# Patient Record
Sex: MALE | Race: BLACK OR AFRICAN AMERICAN | Employment: UNEMPLOYED | URBAN - METROPOLITAN AREA
[De-identification: names, ages, dates, MRNs, and addresses within clinical notes are randomized per-mention and may not be internally consistent; named-entity substitution may affect disease eponyms.]

---

## 2019-01-19 ENCOUNTER — APPOINTMENT (OUTPATIENT)
Dept: GENERAL RADIOLOGY | Age: 44
End: 2019-01-19
Payer: MEDICARE

## 2019-01-19 ENCOUNTER — HOSPITAL ENCOUNTER (EMERGENCY)
Age: 44
Discharge: HOME | End: 2019-01-19
Attending: EMERGENCY MEDICINE
Payer: MEDICARE

## 2019-01-19 VITALS
SYSTOLIC BLOOD PRESSURE: 170 MMHG | WEIGHT: 234.5 LBS | TEMPERATURE: 98.5 F | OXYGEN SATURATION: 99 % | HEIGHT: 69 IN | DIASTOLIC BLOOD PRESSURE: 106 MMHG | HEART RATE: 94 BPM | BODY MASS INDEX: 34.73 KG/M2

## 2019-01-19 DIAGNOSIS — K12.2 UVULITIS: Primary | ICD-10-CM

## 2019-01-19 DIAGNOSIS — R03.0 ELEVATED BLOOD PRESSURE READING: ICD-10-CM

## 2019-01-19 PROCEDURE — 99284 EMERGENCY DEPT VISIT MOD MDM: CPT

## 2019-01-19 PROCEDURE — 70360 X-RAY EXAM OF NECK: CPT

## 2019-01-19 RX ORDER — PENICILLIN V POTASSIUM 500 MG/1
500 TABLET ORAL 3 TIMES DAILY
Qty: 30 TABLET | Refills: 0 | Status: SHIPPED | OUTPATIENT
Start: 2019-01-19 | End: 2019-01-29

## 2019-01-19 RX ORDER — PREDNISONE 20 MG/1
20 TABLET ORAL 2 TIMES DAILY
Qty: 10 TABLET | Refills: 0 | Status: SHIPPED | OUTPATIENT
Start: 2019-01-19 | End: 2019-01-24

## 2019-04-04 ENCOUNTER — HOSPITAL ENCOUNTER (EMERGENCY)
Age: 44
Discharge: HOME | End: 2019-04-04
Attending: EMERGENCY MEDICINE
Payer: MEDICARE

## 2019-04-04 VITALS
WEIGHT: 214.5 LBS | HEIGHT: 69 IN | HEART RATE: 109 BPM | RESPIRATION RATE: 19 BRPM | BODY MASS INDEX: 31.77 KG/M2 | SYSTOLIC BLOOD PRESSURE: 143 MMHG | OXYGEN SATURATION: 98 % | DIASTOLIC BLOOD PRESSURE: 103 MMHG | TEMPERATURE: 98.3 F

## 2019-04-04 DIAGNOSIS — J06.9 ACUTE UPPER RESPIRATORY INFECTION: Primary | ICD-10-CM

## 2019-04-04 PROCEDURE — 6370000000 HC RX 637 (ALT 250 FOR IP): Performed by: EMERGENCY MEDICINE

## 2019-04-04 PROCEDURE — 99283 EMERGENCY DEPT VISIT LOW MDM: CPT

## 2019-04-04 RX ORDER — BENZONATATE 100 MG/1
100 CAPSULE ORAL 3 TIMES DAILY PRN
Qty: 30 CAPSULE | Refills: 0 | Status: SHIPPED | OUTPATIENT
Start: 2019-04-04 | End: 2019-04-11

## 2019-04-04 RX ORDER — DOXYCYCLINE 100 MG/1
100 CAPSULE ORAL ONCE
Status: COMPLETED | OUTPATIENT
Start: 2019-04-04 | End: 2019-04-04

## 2019-04-04 RX ORDER — DOXYCYCLINE 100 MG/1
100 TABLET ORAL 2 TIMES DAILY
Qty: 20 TABLET | Refills: 0 | Status: SHIPPED | OUTPATIENT
Start: 2019-04-04 | End: 2019-04-14

## 2019-04-04 RX ORDER — CETIRIZINE HYDROCHLORIDE 10 MG/1
10 TABLET ORAL DAILY
Qty: 20 TABLET | Refills: 1 | Status: SHIPPED | OUTPATIENT
Start: 2019-04-04 | End: 2019-10-29 | Stop reason: SDUPTHER

## 2019-04-04 RX ORDER — BENZONATATE 100 MG/1
100 CAPSULE ORAL 3 TIMES DAILY PRN
Status: DISCONTINUED | OUTPATIENT
Start: 2019-04-04 | End: 2019-04-04 | Stop reason: HOSPADM

## 2019-04-04 RX ADMIN — DOXYCYCLINE 100 MG: 100 CAPSULE ORAL at 00:37

## 2019-04-04 RX ADMIN — BENZONATATE 100 MG: 100 CAPSULE ORAL at 00:37

## 2019-04-04 ASSESSMENT — PAIN DESCRIPTION - DESCRIPTORS: DESCRIPTORS: ACHING

## 2019-04-04 ASSESSMENT — PAIN SCALES - GENERAL: PAINLEVEL_OUTOF10: 2

## 2019-04-04 ASSESSMENT — PAIN DESCRIPTION - LOCATION: LOCATION: RIB CAGE

## 2019-04-04 ASSESSMENT — PAIN DESCRIPTION - FREQUENCY: FREQUENCY: INTERMITTENT

## 2019-04-04 ASSESSMENT — PAIN DESCRIPTION - PAIN TYPE: TYPE: ACUTE PAIN

## 2019-04-04 NOTE — ED PROVIDER NOTES
Use    Smoking status: Passive Smoke Exposure - Never Smoker    Smokeless tobacco: Never Used   Substance and Sexual Activity    Alcohol use: Yes     Alcohol/week: 1.2 oz     Types: 2 Cans of beer per week     Comment: Daily.  Drug use: Not Currently    Sexual activity: Yes     Partners: Female   Lifestyle    Physical activity:     Days per week: None     Minutes per session: None    Stress: None   Relationships    Social connections:     Talks on phone: None     Gets together: None     Attends Religion service: None     Active member of club or organization: None     Attends meetings of clubs or organizations: None     Relationship status: None    Intimate partner violence:     Fear of current or ex partner: None     Emotionally abused: None     Physically abused: None     Forced sexual activity: None   Other Topics Concern    None   Social History Narrative    None       SCREENINGS      @FLOW([id number])@      PHYSICAL EXAM    (up to 7 for level 4, 8 or more for level 5)     ED Triage Vitals [04/04/19 0027]   BP Temp Temp Source Pulse Resp SpO2 Height Weight   (!) 143/103 98.3 °F (36.8 °C) Oral 109 19 98 % 5' 9\" (1.753 m) 214 lb 8 oz (97.3 kg)       Physical Exam      General Appearance:  Alert, cooperative, no distress, appears stated age. Head:  Normocephalic, without obviousabnormality, atraumatic. Eyes:  conjunctiva/corneas clear, EOM's intact. Sclera anicteric. ENT: Mucous membranes moist.   Neck: Supple, symmetrical, trachea midline, no adenopathy. No jugular venous distention. Lungs:   Clear to auscultation bilaterally, respirationsunlabored. No rales, rhonchi or wheezes. Chest Wall:  No tenderness. Heart:  Regular rate and rhythm, S1 and S2 normal, no murmur, rub or gallop. Abdomen:   Soft, non-tender, bowel sounds active,   no masses, no organomegaly. Extremities: No edema, cords or calf tenderness. Full range of motion.    Pulses: 2+ and symmetric   Skin: Turgor is normal, no rashes or lesions. Neurologic: Alert and oriented X 3. No focal findings. Motor grossly normal.  Speech clear, no drift, CN III-XII grossly intact,        DIAGNOSTIC RESULTS   LABS:    Labs Reviewed - No data to display    All other labs were within normal range or not returned as of this dictation. EKG: All EKG's are interpreted by the Emergency Department Physician who eithersigns or Co-signs this chart in the absence of a cardiologist.        RADIOLOGY:   Non-plain film images such as CT, Ultrasound and MRI are read by the radiologist. Plain radiographic images are visualized by myself. *    Interpretation per the Radiologist below, if available at the time of this note:    No orders to display         PROCEDURES   Unless otherwise noted below, none     Procedures    *    CRITICAL CARE TIME   N/A      EMERGENCY DEPARTMENT COURSE and DIFFERENTIALDIAGNOSIS/MDM:   Vitals:    Vitals:    04/04/19 0027   BP: (!) 143/103   Pulse: 109   Resp: 19   Temp: 98.3 °F (36.8 °C)   TempSrc: Oral   SpO2: 98%   Weight: 97.3 kg (214 lb 8 oz)   Height: 5' 9\" (1.753 m)       Patient was given thefollowing medications:  Medications   doxycycline monohydrate (MONODOX) capsule 100 mg (has no administration in time range)   benzonatate (TESSALON) capsule 100 mg (has no administration in time range)           The patient tolerated their visit well. The patient and / or the familywere informed of the results of any tests, a time was given to answer questions. FINAL IMPRESSION      1.  Acute upper respiratory infection          DISPOSITION/PLAN   DISPOSITION Decision To Discharge 04/04/2019 12:29:57 AM      PATIENT REFERRED TO:  [place] [id number]  [phone]    In 2 days        DISCHARGE MEDICATIONS:  New Prescriptions    BENZONATATE (TESSALON PERLES) 100 MG CAPSULE    Take 1 capsule by mouth 3 times daily as needed for Cough    CETIRIZINE (ZYRTEC ALLERGY) 10 MG TABLET    Take 1 tablet by mouth daily    DOXYCYCLINE MONOHYDRATE (ADOXA) 100 MG TABLET    Take 1 tablet by mouth 2 times daily for 10 days       DISCONTINUED MEDICATIONS:  Discontinued Medications    No medications on file              (Please note that portions of this note were completed with a voice recognition program.  Efforts were made to edit the dictations but occasionally words are mis-transcribed.)    Ky Miller MD (electronically signed)      Ky Miller MD  04/04/19 8041

## 2019-10-16 ENCOUNTER — HOSPITAL ENCOUNTER (EMERGENCY)
Age: 44
Discharge: HOME | End: 2019-10-16
Attending: EMERGENCY MEDICINE
Payer: MEDICARE

## 2019-10-16 ENCOUNTER — APPOINTMENT (OUTPATIENT)
Dept: GENERAL RADIOLOGY | Age: 44
End: 2019-10-16
Payer: MEDICARE

## 2019-10-16 VITALS
DIASTOLIC BLOOD PRESSURE: 98 MMHG | HEIGHT: 69 IN | RESPIRATION RATE: 16 BRPM | OXYGEN SATURATION: 100 % | BODY MASS INDEX: 30.36 KG/M2 | SYSTOLIC BLOOD PRESSURE: 135 MMHG | HEART RATE: 96 BPM | WEIGHT: 205 LBS | TEMPERATURE: 97.4 F

## 2019-10-16 DIAGNOSIS — R06.00 DYSPNEA, UNSPECIFIED TYPE: Primary | ICD-10-CM

## 2019-10-16 LAB
EKG ATRIAL RATE: 93 BPM
EKG DIAGNOSIS: NORMAL
EKG P AXIS: 48 DEGREES
EKG P-R INTERVAL: 146 MS
EKG Q-T INTERVAL: 354 MS
EKG QRS DURATION: 82 MS
EKG QTC CALCULATION (BAZETT): 440 MS
EKG R AXIS: -17 DEGREES
EKG T AXIS: 39 DEGREES
EKG VENTRICULAR RATE: 93 BPM

## 2019-10-16 PROCEDURE — 94761 N-INVAS EAR/PLS OXIMETRY MLT: CPT

## 2019-10-16 PROCEDURE — 6360000002 HC RX W HCPCS: Performed by: EMERGENCY MEDICINE

## 2019-10-16 PROCEDURE — 93005 ELECTROCARDIOGRAM TRACING: CPT | Performed by: EMERGENCY MEDICINE

## 2019-10-16 PROCEDURE — 99284 EMERGENCY DEPT VISIT MOD MDM: CPT

## 2019-10-16 PROCEDURE — 94640 AIRWAY INHALATION TREATMENT: CPT

## 2019-10-16 PROCEDURE — 93010 ELECTROCARDIOGRAM REPORT: CPT | Performed by: INTERNAL MEDICINE

## 2019-10-16 PROCEDURE — 70360 X-RAY EXAM OF NECK: CPT

## 2019-10-16 PROCEDURE — 71046 X-RAY EXAM CHEST 2 VIEWS: CPT

## 2019-10-16 RX ORDER — ALBUTEROL SULFATE 2.5 MG/3ML
2.5 SOLUTION RESPIRATORY (INHALATION) ONCE
Status: COMPLETED | OUTPATIENT
Start: 2019-10-16 | End: 2019-10-16

## 2019-10-16 RX ORDER — ALBUTEROL SULFATE 90 UG/1
2 AEROSOL, METERED RESPIRATORY (INHALATION) EVERY 4 HOURS PRN
Qty: 1 INHALER | Refills: 0 | Status: SHIPPED | OUTPATIENT
Start: 2019-10-16 | End: 2019-10-29 | Stop reason: SDUPTHER

## 2019-10-16 RX ADMIN — ALBUTEROL SULFATE 2.5 MG: 2.5 SOLUTION RESPIRATORY (INHALATION) at 06:21

## 2019-10-29 ENCOUNTER — OFFICE VISIT (OUTPATIENT)
Dept: PRIMARY CARE CLINIC | Age: 44
End: 2019-10-29
Payer: MEDICARE

## 2019-10-29 VITALS
HEART RATE: 97 BPM | WEIGHT: 199 LBS | SYSTOLIC BLOOD PRESSURE: 129 MMHG | HEIGHT: 67 IN | DIASTOLIC BLOOD PRESSURE: 86 MMHG | BODY MASS INDEX: 31.23 KG/M2 | TEMPERATURE: 98.6 F | OXYGEN SATURATION: 98 %

## 2019-10-29 DIAGNOSIS — Z76.89 ENCOUNTER TO ESTABLISH CARE: Primary | ICD-10-CM

## 2019-10-29 DIAGNOSIS — Z78.9 ALCOHOL USE: ICD-10-CM

## 2019-10-29 DIAGNOSIS — J30.89 NON-SEASONAL ALLERGIC RHINITIS, UNSPECIFIED TRIGGER: ICD-10-CM

## 2019-10-29 DIAGNOSIS — Z83.3 FAMILY HISTORY OF DIABETES MELLITUS (DM): ICD-10-CM

## 2019-10-29 DIAGNOSIS — H61.23 BILATERAL IMPACTED CERUMEN: ICD-10-CM

## 2019-10-29 DIAGNOSIS — Z82.49 FAMILY HISTORY OF HYPERTENSION: ICD-10-CM

## 2019-10-29 LAB — HBA1C MFR BLD: 5.5 %

## 2019-10-29 PROCEDURE — 83036 HEMOGLOBIN GLYCOSYLATED A1C: CPT | Performed by: FAMILY MEDICINE

## 2019-10-29 PROCEDURE — 99203 OFFICE O/P NEW LOW 30 MIN: CPT | Performed by: FAMILY MEDICINE

## 2019-10-29 RX ORDER — ALBUTEROL SULFATE 90 UG/1
2 AEROSOL, METERED RESPIRATORY (INHALATION) EVERY 4 HOURS PRN
Qty: 1 INHALER | Refills: 3 | Status: SHIPPED | OUTPATIENT
Start: 2019-10-29 | End: 2020-01-28 | Stop reason: SDUPTHER

## 2019-10-29 RX ORDER — CETIRIZINE HYDROCHLORIDE 10 MG/1
10 TABLET ORAL DAILY
Qty: 20 TABLET | Refills: 1 | Status: SHIPPED | OUTPATIENT
Start: 2019-10-29 | End: 2020-11-07 | Stop reason: SDUPTHER

## 2019-10-29 ASSESSMENT — ENCOUNTER SYMPTOMS
BACK PAIN: 0
VOMITING: 0
EYE PAIN: 0
SHORTNESS OF BREATH: 1
DIARRHEA: 0
NAUSEA: 0
EYE REDNESS: 0
BLOOD IN STOOL: 0
ABDOMINAL PAIN: 0
RHINORRHEA: 0
WHEEZING: 0
CONSTIPATION: 0
COUGH: 0

## 2019-10-29 ASSESSMENT — PATIENT HEALTH QUESTIONNAIRE - PHQ9
2. FEELING DOWN, DEPRESSED OR HOPELESS: 0
SUM OF ALL RESPONSES TO PHQ QUESTIONS 1-9: 0
SUM OF ALL RESPONSES TO PHQ QUESTIONS 1-9: 0
SUM OF ALL RESPONSES TO PHQ9 QUESTIONS 1 & 2: 0
1. LITTLE INTEREST OR PLEASURE IN DOING THINGS: 0

## 2019-11-05 DIAGNOSIS — Z83.3 FAMILY HISTORY OF DIABETES MELLITUS (DM): ICD-10-CM

## 2019-11-05 DIAGNOSIS — Z82.49 FAMILY HISTORY OF HYPERTENSION: ICD-10-CM

## 2019-11-05 LAB
A/G RATIO: 1.3 (ref 1.1–2.2)
ALBUMIN SERPL-MCNC: 3.5 G/DL (ref 3.4–5)
ALP BLD-CCNC: 94 U/L (ref 40–129)
ALT SERPL-CCNC: 67 U/L (ref 10–40)
ANION GAP SERPL CALCULATED.3IONS-SCNC: 14 MMOL/L (ref 3–16)
AST SERPL-CCNC: 83 U/L (ref 15–37)
BASOPHILS ABSOLUTE: 0.1 K/UL (ref 0–0.2)
BASOPHILS RELATIVE PERCENT: 1.6 %
BILIRUB SERPL-MCNC: 0.7 MG/DL (ref 0–1)
BUN BLDV-MCNC: 8 MG/DL (ref 7–20)
CALCIUM SERPL-MCNC: 8.6 MG/DL (ref 8.3–10.6)
CHLORIDE BLD-SCNC: 99 MMOL/L (ref 99–110)
CHOLESTEROL, TOTAL: 262 MG/DL (ref 0–199)
CO2: 22 MMOL/L (ref 21–32)
CREAT SERPL-MCNC: 1 MG/DL (ref 0.9–1.3)
EOSINOPHILS ABSOLUTE: 0 K/UL (ref 0–0.6)
EOSINOPHILS RELATIVE PERCENT: 1.4 %
GFR AFRICAN AMERICAN: >60
GFR NON-AFRICAN AMERICAN: >60
GLOBULIN: 2.8 G/DL
GLUCOSE BLD-MCNC: 120 MG/DL (ref 70–99)
HCT VFR BLD CALC: 42.3 % (ref 40.5–52.5)
HDLC SERPL-MCNC: 61 MG/DL (ref 40–60)
HEMOGLOBIN: 14.4 G/DL (ref 13.5–17.5)
LDL CHOLESTEROL CALCULATED: ABNORMAL MG/DL
LDL CHOLESTEROL DIRECT: 17 MG/DL
LYMPHOCYTES ABSOLUTE: 1.1 K/UL (ref 1–5.1)
LYMPHOCYTES RELATIVE PERCENT: 34.5 %
MCH RBC QN AUTO: 33.4 PG (ref 26–34)
MCHC RBC AUTO-ENTMCNC: 34 G/DL (ref 31–36)
MCV RBC AUTO: 98.3 FL (ref 80–100)
MONOCYTES ABSOLUTE: 0.5 K/UL (ref 0–1.3)
MONOCYTES RELATIVE PERCENT: 17 %
NEUTROPHILS ABSOLUTE: 1.5 K/UL (ref 1.7–7.7)
NEUTROPHILS RELATIVE PERCENT: 45.5 %
PDW BLD-RTO: 14.6 % (ref 12.4–15.4)
PLATELET # BLD: 178 K/UL (ref 135–450)
PMV BLD AUTO: 10.8 FL (ref 5–10.5)
POTASSIUM SERPL-SCNC: 4.1 MMOL/L (ref 3.5–5.1)
RBC # BLD: 4.31 M/UL (ref 4.2–5.9)
SODIUM BLD-SCNC: 135 MMOL/L (ref 136–145)
TOTAL PROTEIN: 6.3 G/DL (ref 6.4–8.2)
TRIGL SERPL-MCNC: 612 MG/DL (ref 0–150)
VLDLC SERPL CALC-MCNC: ABNORMAL MG/DL
WBC # BLD: 3.2 K/UL (ref 4–11)

## 2019-11-06 DIAGNOSIS — E78.2 HYPERCHOLESTEROLEMIA WITH HYPERTRIGLYCERIDEMIA: Primary | ICD-10-CM

## 2019-11-06 RX ORDER — ICOSAPENT ETHYL 1000 MG/1
2 CAPSULE ORAL 2 TIMES DAILY
Qty: 120 CAPSULE | Refills: 3 | Status: SHIPPED | OUTPATIENT
Start: 2019-11-06 | End: 2019-12-06

## 2019-11-13 ENCOUNTER — TELEPHONE (OUTPATIENT)
Dept: PRIMARY CARE CLINIC | Age: 44
End: 2019-11-13

## 2019-11-21 ENCOUNTER — TELEPHONE (OUTPATIENT)
Dept: PRIMARY CARE CLINIC | Age: 44
End: 2019-11-21

## 2019-12-05 PROBLEM — E78.1 HYPERTRIGLYCERIDEMIA: Status: ACTIVE | Noted: 2019-12-05

## 2019-12-05 PROBLEM — E78.2 MIXED HYPERLIPIDEMIA: Status: ACTIVE | Noted: 2019-12-05

## 2019-12-06 DIAGNOSIS — E78.2 MIXED HYPERLIPIDEMIA: ICD-10-CM

## 2019-12-06 DIAGNOSIS — E66.09 NON MORBID OBESITY DUE TO EXCESS CALORIES: ICD-10-CM

## 2019-12-06 DIAGNOSIS — E78.1 HYPERTRIGLYCERIDEMIA: Primary | ICD-10-CM

## 2019-12-06 RX ORDER — ATORVASTATIN CALCIUM 10 MG/1
10 TABLET, FILM COATED ORAL DAILY
Qty: 30 TABLET | Refills: 0 | Status: SHIPPED | OUTPATIENT
Start: 2019-12-06 | End: 2020-01-28 | Stop reason: SDUPTHER

## 2019-12-06 RX ORDER — ICOSAPENT ETHYL 1000 MG/1
2 CAPSULE ORAL 2 TIMES DAILY
Qty: 120 CAPSULE | Refills: 3 | Status: SHIPPED | OUTPATIENT
Start: 2019-12-06 | End: 2020-01-28

## 2020-01-13 ENCOUNTER — TELEPHONE (OUTPATIENT)
Dept: PRIMARY CARE CLINIC | Age: 45
End: 2020-01-13

## 2020-01-13 NOTE — TELEPHONE ENCOUNTER
Please contact insurance company and try to authorize Vascepa for pt   Please be sure to add the prior auth letter that Annalise Lopez has provided to the [address] in order to assist in pt getting this medication.     This was prescribed again on 12/06/2019 and pt is still waiting to see if he can get approved with using the auth letter from Annalise Lopez     Thank you

## 2020-01-28 ENCOUNTER — OFFICE VISIT (OUTPATIENT)
Dept: PRIMARY CARE CLINIC | Age: 45
End: 2020-01-28
Payer: MEDICARE

## 2020-01-28 VITALS
DIASTOLIC BLOOD PRESSURE: 97 MMHG | OXYGEN SATURATION: 99 % | TEMPERATURE: 98.1 F | RESPIRATION RATE: 12 BRPM | HEART RATE: 72 BPM | WEIGHT: 209 LBS | SYSTOLIC BLOOD PRESSURE: 158 MMHG | BODY MASS INDEX: 33.09 KG/M2

## 2020-01-28 PROCEDURE — G8427 DOCREV CUR MEDS BY ELIG CLIN: HCPCS | Performed by: FAMILY MEDICINE

## 2020-01-28 PROCEDURE — 99215 OFFICE O/P EST HI 40 MIN: CPT | Performed by: FAMILY MEDICINE

## 2020-01-28 PROCEDURE — 1036F TOBACCO NON-USER: CPT | Performed by: FAMILY MEDICINE

## 2020-01-28 PROCEDURE — G8484 FLU IMMUNIZE NO ADMIN: HCPCS | Performed by: FAMILY MEDICINE

## 2020-01-28 PROCEDURE — G8417 CALC BMI ABV UP PARAM F/U: HCPCS | Performed by: FAMILY MEDICINE

## 2020-01-28 RX ORDER — ALBUTEROL SULFATE 90 UG/1
2 AEROSOL, METERED RESPIRATORY (INHALATION) EVERY 4 HOURS PRN
Qty: 1 INHALER | Refills: 3 | Status: SHIPPED | OUTPATIENT
Start: 2020-01-28 | End: 2020-04-03 | Stop reason: SDUPTHER

## 2020-01-28 RX ORDER — CHLORAL HYDRATE 500 MG
2000 CAPSULE ORAL 3 TIMES DAILY
Qty: 90 CAPSULE | Refills: 3 | Status: SHIPPED | OUTPATIENT
Start: 2020-01-28 | End: 2020-04-28

## 2020-01-28 RX ORDER — ATORVASTATIN CALCIUM 10 MG/1
10 TABLET, FILM COATED ORAL DAILY
Qty: 30 TABLET | Refills: 0 | Status: SHIPPED | OUTPATIENT
Start: 2020-01-28 | End: 2020-03-04

## 2020-01-28 ASSESSMENT — ENCOUNTER SYMPTOMS
RHINORRHEA: 0
SINUS PRESSURE: 0
SORE THROAT: 0
COUGH: 0
BACK PAIN: 0
SINUS PAIN: 0
ABDOMINAL PAIN: 0
SHORTNESS OF BREATH: 0

## 2020-01-28 ASSESSMENT — PATIENT HEALTH QUESTIONNAIRE - PHQ9
1. LITTLE INTEREST OR PLEASURE IN DOING THINGS: 0
SUM OF ALL RESPONSES TO PHQ9 QUESTIONS 1 & 2: 0
SUM OF ALL RESPONSES TO PHQ QUESTIONS 1-9: 0
1. LITTLE INTEREST OR PLEASURE IN DOING THINGS: 0
SUM OF ALL RESPONSES TO PHQ QUESTIONS 1-9: 0
2. FEELING DOWN, DEPRESSED OR HOPELESS: 0
SUM OF ALL RESPONSES TO PHQ QUESTIONS 1-9: 0
SUM OF ALL RESPONSES TO PHQ QUESTIONS 1-9: 0
SUM OF ALL RESPONSES TO PHQ9 QUESTIONS 1 & 2: 0
2. FEELING DOWN, DEPRESSED OR HOPELESS: 0

## 2020-01-28 NOTE — PROGRESS NOTES
for adenopathy. Psychiatric/Behavioral: Negative for behavioral problems. Past Medical History:   Diagnosis Date    Kidney stone        Family History   Problem Relation Age of Onset    Osteoarthritis Mother     Cancer Mother     Asthma Father     Diabetes Sister     Hypertension Sister     Stroke Sister     Seizures Sister        Social History     Tobacco Use    Smoking status: Former Smoker     Packs/day: 1.50     Years: 7.00     Pack years: 10.50     Types: Cigarettes     Last attempt to quit: 10/29/1997     Years since quittin.2    Smokeless tobacco: Never Used   Substance Use Topics    Alcohol use: Yes     Alcohol/week: 2.0 standard drinks     Types: 2 Cans of beer per week     Comment: Daily.  Drug use: Not Currently       New Prescriptions    OMEGA-3 FATTY ACIDS (FISH OIL) 1000 MG CAPS    Take 2 capsules by mouth 3 times daily       Meds Prior to visit:  Current Outpatient Medications on File Prior to Visit   Medication Sig Dispense Refill    cetirizine (ZYRTEC ALLERGY) 10 MG tablet Take 1 tablet by mouth daily 20 tablet 1     No current facility-administered medications on file prior to visit. Allergies   Allergen Reactions    Bee Venom        OBJECTIVE:  BP (!) 158/97   Pulse 72   Temp 98.1 °F (36.7 °C) (Oral)   Resp 12   Wt 209 lb (94.8 kg)   SpO2 99%   BMI 33.09 kg/m²   BP Readings from Last 2 Encounters:   20 (!) 158/97   10/29/19 129/86     Wt Readings from Last 3 Encounters:   20 209 lb (94.8 kg)   10/29/19 199 lb (90.3 kg)   10/16/19 205 lb (93 kg)       Physical Exam  Constitutional:       General: He is not in acute distress. Appearance: Normal appearance. He is normal weight. He is not ill-appearing. HENT:      Head: Normocephalic and atraumatic. Right Ear: Tympanic membrane, ear canal and external ear normal. There is impacted cerumen. Left Ear: Tympanic membrane, ear canal and external ear normal. There is impacted cerumen. Nose: Nose normal.      Mouth/Throat:      Mouth: Mucous membranes are moist.      Pharynx: Oropharynx is clear. No posterior oropharyngeal erythema. Eyes:      Extraocular Movements: Extraocular movements intact. Conjunctiva/sclera: Conjunctivae normal.      Pupils: Pupils are equal, round, and reactive to light. Neck:      Musculoskeletal: Normal range of motion. Cardiovascular:      Rate and Rhythm: Normal rate and regular rhythm. Pulses: Normal pulses. Heart sounds: Normal heart sounds. Pulmonary:      Effort: Pulmonary effort is normal. No respiratory distress. Breath sounds: Normal breath sounds. Abdominal:      General: Bowel sounds are normal.      Tenderness: There is no abdominal tenderness. Musculoskeletal: Normal range of motion. Skin:     General: Skin is warm. Capillary Refill: Capillary refill takes less than 2 seconds. Findings: No rash. Neurological:      General: No focal deficit present. Mental Status: He is alert and oriented to person, place, and time. Mental status is at baseline. Motor: No weakness. Coordination: Coordination normal.      Gait: Gait normal.   Psychiatric:         Mood and Affect: Mood normal.         Behavior: Behavior normal.         Thought Content:  Thought content normal.         Judgment: Judgment normal.       Lab Results   Component Value Date    WBC 3.2 (L) 11/05/2019    HGB 14.4 11/05/2019    HCT 42.3 11/05/2019    MCV 98.3 11/05/2019     11/05/2019     Lab Results   Component Value Date     (L) 11/05/2019    K 4.1 11/05/2019    CL 99 11/05/2019    CO2 22 11/05/2019    BUN 8 11/05/2019    CREATININE 1.0 11/05/2019    GLUCOSE 120 (H) 11/05/2019    CALCIUM 8.6 11/05/2019    PROT 6.3 (L) 11/05/2019    LABALBU 3.5 11/05/2019    BILITOT 0.7 11/05/2019    ALKPHOS 94 11/05/2019    AST 83 (H) 11/05/2019    ALT 67 (H) 11/05/2019    LABGLOM >60 11/05/2019    GFRAA >60 11/05/2019    AGRATIO 1.3 11/05/2019 GLOB 2.8 11/05/2019     Lab Results   Component Value Date    CHOL 262 (H) 11/05/2019     Lab Results   Component Value Date    TRIG 612 (H) 11/05/2019     Lab Results   Component Value Date    HDL 61 (H) 11/05/2019     Lab Results   Component Value Date    LDLCALC see below 11/05/2019     Lab Results   Component Value Date    LABVLDL see below 11/05/2019     Lab Results   Component Value Date    LABA1C 5.5 10/29/2019         ASSESSMENT/PLAN:  1. Alcohol use  Discussed cutting back in cessation. Follow-up in 3 months. 2. Non morbid obesity due to excess calories  Continue Lipitor 10 mg daily. Discussed dietary modifications. Goal to be 3 pounds lighter in 3 months. Follow-up in 3 months  - atorvastatin (LIPITOR) 10 MG tablet; Take 1 tablet by mouth daily  Dispense: 30 tablet; Refill: 0    3. Hypertriglyceridemia  As above  - atorvastatin (LIPITOR) 10 MG tablet; Take 1 tablet by mouth daily  Dispense: 30 tablet; Refill: 0  - Omega-3 Fatty Acids (FISH OIL) 1000 MG CAPS; Take 2 capsules by mouth 3 times daily  Dispense: 90 capsule; Refill: 3    4. Mixed hyperlipidemia  As above  - atorvastatin (LIPITOR) 10 MG tablet; Take 1 tablet by mouth daily  Dispense: 30 tablet; Refill: 0  - Omega-3 Fatty Acids (FISH OIL) 1000 MG CAPS; Take 2 capsules by mouth 3 times daily  Dispense: 90 capsule; Refill: 3    5. Non-seasonal allergic rhinitis, unspecified trigger  Refill. Symptoms have resolved  - albuterol sulfate HFA (PROVENTIL HFA) 108 (90 Base) MCG/ACT inhaler; Inhale 2 puffs into the lungs every 4 hours as needed for Wheezing or Shortness of Breath Please provide pt c spacer as well  Dispense: 1 Inhaler; Refill: 3      Discussed use, benefit, and side effects of prescribed medications. Barriers to medication compliance addressed. All patient questions answered. Pt voiced understanding. RTC Return in about 3 months (around 4/28/2020).     Future Appointments   Date Time Provider Forrest Moreno   4/28/2020 11:15 AM Maulik Ga MD [facility]       Barbara Bynum MD  1/28/2020  11:47 AM

## 2020-03-04 RX ORDER — ATORVASTATIN CALCIUM 10 MG/1
10 TABLET, FILM COATED ORAL DAILY
Qty: 30 TABLET | Refills: 0 | Status: SHIPPED | OUTPATIENT
Start: 2020-03-04 | End: 2020-04-03 | Stop reason: SDUPTHER

## 2020-04-03 ENCOUNTER — PATIENT MESSAGE (OUTPATIENT)
Dept: PRIMARY CARE CLINIC | Age: 45
End: 2020-04-03

## 2020-04-03 RX ORDER — ATORVASTATIN CALCIUM 10 MG/1
10 TABLET, FILM COATED ORAL DAILY
Qty: 30 TABLET | Refills: 3 | Status: SHIPPED | OUTPATIENT
Start: 2020-04-03 | End: 2020-04-28 | Stop reason: SDUPTHER

## 2020-04-03 RX ORDER — ALBUTEROL SULFATE 90 UG/1
2 AEROSOL, METERED RESPIRATORY (INHALATION) EVERY 4 HOURS PRN
Qty: 1 INHALER | Refills: 3 | Status: SHIPPED | OUTPATIENT
Start: 2020-04-03 | End: 2020-04-28 | Stop reason: SDUPTHER

## 2020-04-03 RX ORDER — INHALER, ASSIST DEVICES
SPACER (EA) MISCELLANEOUS
Qty: 1 DEVICE | Refills: 0 | Status: SHIPPED | OUTPATIENT
Start: 2020-04-03

## 2020-04-27 PROBLEM — Z78.9 ALCOHOL USE: Status: ACTIVE | Noted: 2020-04-27

## 2020-04-28 ENCOUNTER — VIRTUAL VISIT (OUTPATIENT)
Dept: PRIMARY CARE CLINIC | Age: 45
End: 2020-04-28
Payer: MEDICARE

## 2020-04-28 PROCEDURE — 1036F TOBACCO NON-USER: CPT | Performed by: FAMILY MEDICINE

## 2020-04-28 PROCEDURE — G8428 CUR MEDS NOT DOCUMENT: HCPCS | Performed by: FAMILY MEDICINE

## 2020-04-28 PROCEDURE — G8417 CALC BMI ABV UP PARAM F/U: HCPCS | Performed by: FAMILY MEDICINE

## 2020-04-28 PROCEDURE — 99213 OFFICE O/P EST LOW 20 MIN: CPT | Performed by: FAMILY MEDICINE

## 2020-04-28 RX ORDER — ATORVASTATIN CALCIUM 10 MG/1
10 TABLET, FILM COATED ORAL DAILY
Qty: 30 TABLET | Refills: 5 | Status: SHIPPED | OUTPATIENT
Start: 2020-04-28 | End: 2020-07-02 | Stop reason: SDUPTHER

## 2020-04-28 RX ORDER — ALBUTEROL SULFATE 90 UG/1
2 AEROSOL, METERED RESPIRATORY (INHALATION) EVERY 4 HOURS PRN
Qty: 1 INHALER | Refills: 3 | Status: SHIPPED | OUTPATIENT
Start: 2020-04-28 | End: 2020-07-02 | Stop reason: SDUPTHER

## 2020-04-28 RX ORDER — OMEGA-3/DHA/EPA/FISH OIL 60 MG-90MG
500 CAPSULE ORAL DAILY
Qty: 90 CAPSULE | Refills: 5 | Status: SHIPPED | OUTPATIENT
Start: 2020-04-28 | End: 2021-05-13 | Stop reason: SDUPTHER

## 2020-04-28 ASSESSMENT — ENCOUNTER SYMPTOMS
CHEST TIGHTNESS: 0
ABDOMINAL PAIN: 0
COUGH: 0
SHORTNESS OF BREATH: 0
WHEEZING: 0
EYE REDNESS: 0
DIARRHEA: 0

## 2020-04-28 NOTE — PROGRESS NOTES
2020    TELEHEALTH EVALUATION -- Audio/Visual (During OSDWX-60 public health emergency)    HPI:    Maribel Underwood (:  1975) has requested an audio/video evaluation for the following concern(s):    3 month follow up:    Alcohol use:  LOV, was drinking 3 tall boys per day. Now he is drinking about 4 days per week. Will drink about 2-3 beers. He thinks he has cut back because of social isolation, not that he has really been trying too hard. He is trying to keep his 2 daughters entertained. He isnt able to work as much as he would like. HLD:  The 10-year ASCVD risk score (James Blanco, et al., 2013) is: 5.5%    Values used to calculate the score:      Age: 38 years      Sex: Male      Is Non- : Yes      Diabetic: No      Tobacco smoker: No      Systolic Blood Pressure: 571 mmHg      Is BP treated: No      HDL Cholesterol: 61 mg/dL      Total Cholesterol: 262 mg/dL  Would like refills of lipitor and fish oil. He needs smaller fish oil tablets. Review of Systems   Constitutional: Negative for activity change, chills, fatigue and fever. HENT: Negative for congestion. Eyes: Negative for redness. Respiratory: Negative for cough, chest tightness, shortness of breath and wheezing. Cardiovascular: Negative for chest pain and palpitations. Gastrointestinal: Negative for abdominal pain and diarrhea. Genitourinary: Negative for difficulty urinating. Musculoskeletal: Negative for arthralgias. Skin: Negative for rash. Allergic/Immunologic: Negative for immunocompromised state. Neurological: Negative for dizziness, light-headedness and headaches. Hematological: Negative for adenopathy. Psychiatric/Behavioral: Negative for behavioral problems. Prior to Visit Medications    Medication Sig Taking?  Authorizing Provider   albuterol sulfate HFA (PROVENTIL HFA) 108 (90 Base) MCG/ACT inhaler Inhale 2 puffs into the lungs every 4 hours as needed for Wheezing

## 2020-07-02 RX ORDER — ATORVASTATIN CALCIUM 10 MG/1
10 TABLET, FILM COATED ORAL DAILY
Qty: 30 TABLET | Refills: 5 | Status: SHIPPED | OUTPATIENT
Start: 2020-07-02 | End: 2021-02-02 | Stop reason: SDUPTHER

## 2020-07-02 RX ORDER — ALBUTEROL SULFATE 90 UG/1
2 AEROSOL, METERED RESPIRATORY (INHALATION) EVERY 4 HOURS PRN
Qty: 1 INHALER | Refills: 3 | Status: SHIPPED | OUTPATIENT
Start: 2020-07-02 | End: 2020-08-11 | Stop reason: SDUPTHER

## 2020-07-02 NOTE — TELEPHONE ENCOUNTER
Pt need refills on atorvastatin (LIPITOR) 10 MG tablet [94220]   atorvastatin (LIPITOR) 10 MG tablet      albuterol sulfate HFA (PROVENTIL HFA) 108 called in to [facility]

## 2020-08-11 RX ORDER — ALBUTEROL SULFATE 90 UG/1
2 AEROSOL, METERED RESPIRATORY (INHALATION) EVERY 4 HOURS PRN
Qty: 1 INHALER | Refills: 3 | Status: SHIPPED | OUTPATIENT
Start: 2020-08-11 | End: 2021-02-04 | Stop reason: SDUPTHER

## 2020-11-01 NOTE — PROGRESS NOTES
Chief Complaint   Patient presents with    Hypertension         HPI:  Lisa Zamudio is a 41 y.o. (: 1975) here today for     Alcohol use: has not changed. Still drinking 2 tall boys per day but has increased water intake. Blood pressure elevated today and has been on previous office visits. Patient agrees to start a medication to bring his blood pressure down and decrease his overall CVD risk. The 10-year ASCVD risk score (Arsenio Bahena, et al., 2013) is: 5.6%    Values used to calculate the score:      Age: 41 years      Sex: Male      Is Non- : Yes      Diabetic: No      Tobacco smoker: No      Systolic Blood Pressure: 678 mmHg      Is BP treated: No      HDL Cholesterol: 61 mg/dL      Total Cholesterol: 262 mg/dL    Briefly mentioned erectile dysfunction. Not when he is sexually active but he notices a incomplete erection in the morning and it is usually complete. This is worrying him. Review of Systems   Constitutional: Negative for appetite change, chills, diaphoresis, fatigue, fever and unexpected weight change. HENT: Negative for congestion, postnasal drip, rhinorrhea, sinus pressure, sneezing and sore throat. Eyes: Negative for redness, itching and visual disturbance. Respiratory: Negative for cough, chest tightness, shortness of breath and wheezing. Cardiovascular: Negative for chest pain, palpitations and leg swelling. Gastrointestinal: Negative for abdominal pain, blood in stool, constipation, diarrhea, nausea and vomiting. Endocrine: Negative for cold intolerance, heat intolerance, polydipsia and polyuria. Genitourinary: Negative for decreased urine volume and dysuria. Musculoskeletal: Negative for arthralgias, back pain, joint swelling, myalgias and neck pain. Skin: Negative for rash and wound. Allergic/Immunologic: Negative for environmental allergies.    Neurological: Negative for dizziness, tremors, syncope, weakness, light-headedness, numbness and headaches. Hematological: Negative for adenopathy. Does not bruise/bleed easily. Psychiatric/Behavioral: Negative for agitation, behavioral problems, confusion, decreased concentration, self-injury, sleep disturbance and suicidal ideas. The patient is not nervous/anxious. Past Medical History:   Diagnosis Date    Kidney stone        Family History   Problem Relation Age of Onset    Osteoarthritis Mother     Cancer Mother     Asthma Father     Diabetes Sister     Hypertension Sister     Stroke Sister     Seizures Sister        Social History     Tobacco Use    Smoking status: Former Smoker     Packs/day: 1.50     Years: 7.00     Pack years: 10.50     Types: Cigarettes     Last attempt to quit: 10/29/1997     Years since quittin.0    Smokeless tobacco: Never Used   Substance Use Topics    Alcohol use: Yes     Alcohol/week: 4.0 standard drinks     Types: 4 Cans of beer per week     Frequency: 4 or more times a week     Drinks per session: 3 or 4     Binge frequency: Never     Comment: Daily.     Drug use: Not Currently       New Prescriptions    AMLODIPINE (NORVASC) 5 MG TABLET    Take 1 tablet by mouth daily       Meds Prior to visit:  Current Outpatient Medications on File Prior to Visit   Medication Sig Dispense Refill    albuterol sulfate HFA (PROVENTIL HFA) 108 (90 Base) MCG/ACT inhaler Inhale 2 puffs into the lungs every 4 hours as needed for Wheezing or Shortness of Breath Please provide pt c spacer as well 1 Inhaler 3    atorvastatin (LIPITOR) 10 MG tablet Take 1 tablet by mouth daily 30 tablet 5    Omega-3 Fatty Acids (FISH OIL) 500 MG CAPS Take 500 mg by mouth daily 90 capsule 5    Spacer/Aero-Holding Chambers (VALVED HOLDING CHAMBER) DOUGIE USE WITH INHALER AS DIRECTED (Patient not taking: Reported on 2020) 1 Device 0    cetirizine (ZYRTEC ALLERGY) 10 MG tablet Take 1 tablet by mouth daily (Patient not taking: Reported on 2020) 20 tablet 1     No current (H) 11/05/2019     Lab Results   Component Value Date    HDL 61 (H) 11/05/2019     Lab Results   Component Value Date    LDLCALC see below 11/05/2019     Lab Results   Component Value Date    LABVLDL see below 11/05/2019     Lab Results   Component Value Date    LABA1C 5.5 10/29/2019         ASSESSMENT/PLAN:  1. Essential hypertension  Start amlodipine 5 mg daily. We will follow-up in 4 weeks for recheck and titration of medication. Update kidney function  - amLODIPine (NORVASC) 5 MG tablet; Take 1 tablet by mouth daily  Dispense: 60 tablet; Refill: 3    2. Alcohol use  Recommended he decrease alcohol intake to 1 tablet instead of 2/day. We will slowly work on this. Update labs  - Comprehensive Metabolic Panel; Future  - CBC Auto Differential; Future    3. BMI 31.0-31.9,adult  As above. We will work on lifestyle modifications including increasing exercise on a weekly basis as well as eating less fried fatty foods and more vegetables  - Lipid Panel; Future    4. Mixed hyperlipidemia  As above. Update lipid panel to calculate ASCVD risk to titrate Lipitor  Continue Lipitor 10 mg daily for now  - Lipid Panel; Future  - amLODIPine (NORVASC) 5 MG tablet; Take 1 tablet by mouth daily  Dispense: 60 tablet; Refill: 3        Discussed use, benefit, and side effects of prescribed medications. Barriers to medication compliance addressed. All patient questions answered. Pt voiced understanding. RTC Return if symptoms worsen or fail to improve, for 4-6 weeks for HTN. No future appointments.     Tiana Armstrong MD  11/2/2020  11:13 AM

## 2020-11-02 ENCOUNTER — OFFICE VISIT (OUTPATIENT)
Dept: PRIMARY CARE CLINIC | Age: 45
End: 2020-11-02
Payer: MEDICARE

## 2020-11-02 VITALS
SYSTOLIC BLOOD PRESSURE: 155 MMHG | DIASTOLIC BLOOD PRESSURE: 108 MMHG | HEIGHT: 67 IN | TEMPERATURE: 97 F | HEART RATE: 94 BPM | RESPIRATION RATE: 16 BRPM | WEIGHT: 218.4 LBS | BODY MASS INDEX: 34.28 KG/M2

## 2020-11-02 PROCEDURE — G8427 DOCREV CUR MEDS BY ELIG CLIN: HCPCS | Performed by: FAMILY MEDICINE

## 2020-11-02 PROCEDURE — 1036F TOBACCO NON-USER: CPT | Performed by: FAMILY MEDICINE

## 2020-11-02 PROCEDURE — G8484 FLU IMMUNIZE NO ADMIN: HCPCS | Performed by: FAMILY MEDICINE

## 2020-11-02 PROCEDURE — 99214 OFFICE O/P EST MOD 30 MIN: CPT | Performed by: FAMILY MEDICINE

## 2020-11-02 PROCEDURE — G8417 CALC BMI ABV UP PARAM F/U: HCPCS | Performed by: FAMILY MEDICINE

## 2020-11-02 RX ORDER — AMLODIPINE BESYLATE 5 MG/1
5 TABLET ORAL DAILY
Qty: 60 TABLET | Refills: 3 | Status: SHIPPED | OUTPATIENT
Start: 2020-11-02 | End: 2021-02-04 | Stop reason: SDUPTHER

## 2020-11-02 SDOH — HEALTH STABILITY: MENTAL HEALTH: HOW OFTEN DO YOU HAVE A DRINK CONTAINING ALCOHOL?: 4 OR MORE TIMES A WEEK

## 2020-11-02 SDOH — HEALTH STABILITY: MENTAL HEALTH: HOW MANY STANDARD DRINKS CONTAINING ALCOHOL DO YOU HAVE ON A TYPICAL DAY?: 3 OR 4

## 2020-11-02 ASSESSMENT — ENCOUNTER SYMPTOMS
DIARRHEA: 0
CONSTIPATION: 0
SHORTNESS OF BREATH: 0
BACK PAIN: 0
BLOOD IN STOOL: 0
SINUS PRESSURE: 0
SORE THROAT: 0
WHEEZING: 0
RHINORRHEA: 0
NAUSEA: 0
EYE ITCHING: 0
COUGH: 0
VOMITING: 0
ABDOMINAL PAIN: 0
EYE REDNESS: 0
CHEST TIGHTNESS: 0

## 2020-11-05 DIAGNOSIS — E78.2 MIXED HYPERLIPIDEMIA: ICD-10-CM

## 2020-11-05 DIAGNOSIS — Z78.9 ALCOHOL USE: ICD-10-CM

## 2020-11-05 LAB
A/G RATIO: 1.2 (ref 1.1–2.2)
ALBUMIN SERPL-MCNC: 3.6 G/DL (ref 3.4–5)
ALP BLD-CCNC: 106 U/L (ref 40–129)
ALT SERPL-CCNC: 94 U/L (ref 10–40)
ANION GAP SERPL CALCULATED.3IONS-SCNC: 12 MMOL/L (ref 3–16)
AST SERPL-CCNC: 90 U/L (ref 15–37)
BASOPHILS ABSOLUTE: 0.1 K/UL (ref 0–0.2)
BASOPHILS RELATIVE PERCENT: 1.1 %
BILIRUB SERPL-MCNC: 0.6 MG/DL (ref 0–1)
BUN BLDV-MCNC: 7 MG/DL (ref 7–20)
CALCIUM SERPL-MCNC: 8.8 MG/DL (ref 8.3–10.6)
CHLORIDE BLD-SCNC: 99 MMOL/L (ref 99–110)
CHOLESTEROL, TOTAL: 134 MG/DL (ref 0–199)
CO2: 26 MMOL/L (ref 21–32)
CREAT SERPL-MCNC: 1 MG/DL (ref 0.9–1.3)
EOSINOPHILS ABSOLUTE: 0.1 K/UL (ref 0–0.6)
EOSINOPHILS RELATIVE PERCENT: 2.3 %
GFR AFRICAN AMERICAN: >60
GFR NON-AFRICAN AMERICAN: >60
GLOBULIN: 2.9 G/DL
GLUCOSE BLD-MCNC: 95 MG/DL (ref 70–99)
HCT VFR BLD CALC: 40.6 % (ref 40.5–52.5)
HDLC SERPL-MCNC: 56 MG/DL (ref 40–60)
HEMOGLOBIN: 13.6 G/DL (ref 13.5–17.5)
LDL CHOLESTEROL CALCULATED: 51 MG/DL
LYMPHOCYTES ABSOLUTE: 1.8 K/UL (ref 1–5.1)
LYMPHOCYTES RELATIVE PERCENT: 30.8 %
MCH RBC QN AUTO: 32.6 PG (ref 26–34)
MCHC RBC AUTO-ENTMCNC: 33.6 G/DL (ref 31–36)
MCV RBC AUTO: 97.3 FL (ref 80–100)
MONOCYTES ABSOLUTE: 0.8 K/UL (ref 0–1.3)
MONOCYTES RELATIVE PERCENT: 13.3 %
NEUTROPHILS ABSOLUTE: 3 K/UL (ref 1.7–7.7)
NEUTROPHILS RELATIVE PERCENT: 52.5 %
PDW BLD-RTO: 14.1 % (ref 12.4–15.4)
PLATELET # BLD: 165 K/UL (ref 135–450)
PMV BLD AUTO: 10.6 FL (ref 5–10.5)
POTASSIUM SERPL-SCNC: 4.1 MMOL/L (ref 3.5–5.1)
RBC # BLD: 4.17 M/UL (ref 4.2–5.9)
SODIUM BLD-SCNC: 137 MMOL/L (ref 136–145)
TOTAL PROTEIN: 6.5 G/DL (ref 6.4–8.2)
TRIGL SERPL-MCNC: 134 MG/DL (ref 0–150)
VLDLC SERPL CALC-MCNC: 27 MG/DL
WBC # BLD: 5.8 K/UL (ref 4–11)

## 2020-11-07 RX ORDER — CETIRIZINE HYDROCHLORIDE 10 MG/1
10 TABLET ORAL DAILY
Qty: 20 TABLET | Refills: 1 | Status: SHIPPED | OUTPATIENT
Start: 2020-11-07 | End: 2021-06-23

## 2020-12-06 NOTE — PROGRESS NOTES
Chief Complaint   Patient presents with    Hypertension         HPI:  Lluvia Heard is a 41 y.o. (: 1975) here today for HTN follow up. Alcohol use: Has not changed. States he is drinking about the same amount. HTN  Rx: norvasc 5 mg daily  Compliant: yes  Does not check BP at home. Lab Results   Component Value Date    CREATININE 1.0 2020       HLD  Rx: statin  The 10-year ASCVD risk score (Nicola Conrad, et al., 2013) is: 3.7%    Values used to calculate the score:      Age: 41 years      Sex: Male      Is Non- : Yes      Diabetic: No      Tobacco smoker: No      Systolic Blood Pressure: 603 mmHg      Is BP treated: No      HDL Cholesterol: 56 mg/dL      Total Cholesterol: 134 mg/dL    Review of Systems   Constitutional: Negative for activity change, chills, fatigue and fever. HENT: Negative for congestion. Eyes: Negative for redness. Respiratory: Negative for cough, chest tightness, shortness of breath and wheezing. Cardiovascular: Negative for chest pain and palpitations. Gastrointestinal: Negative for abdominal pain and diarrhea. Genitourinary: Negative for difficulty urinating. Musculoskeletal: Negative for arthralgias. Skin: Negative for rash. Allergic/Immunologic: Negative for immunocompromised state. Neurological: Negative for dizziness, light-headedness and headaches. Hematological: Negative for adenopathy. Psychiatric/Behavioral: Negative for behavioral problems.        Past Medical History:   Diagnosis Date    Kidney stone        Family History   Problem Relation Age of Onset    Osteoarthritis Mother     Cancer Mother     Asthma Father     Diabetes Sister     Hypertension Sister     Stroke Sister     Seizures Sister        Social History     Tobacco Use    Smoking status: Former Smoker     Packs/day: 1.50     Years: 7.00     Pack years: 10.50     Types: Cigarettes     Last attempt to quit: 10/29/1997     Years since quittin.1  Smokeless tobacco: Never Used   Substance Use Topics    Alcohol use: Yes     Alcohol/week: 4.0 standard drinks     Types: 4 Cans of beer per week     Frequency: 4 or more times a week     Drinks per session: 3 or 4     Binge frequency: Never     Comment: Daily.  Drug use: Not Currently       New Prescriptions    BLOOD PRESSURE KIT    1 kit by Does not apply route daily       Meds Prior to visit:  Current Outpatient Medications on File Prior to Visit   Medication Sig Dispense Refill    cetirizine (ZYRTEC ALLERGY) 10 MG tablet Take 1 tablet by mouth daily 20 tablet 1    amLODIPine (NORVASC) 5 MG tablet Take 1 tablet by mouth daily 60 tablet 3    albuterol sulfate HFA (PROVENTIL HFA) 108 (90 Base) MCG/ACT inhaler Inhale 2 puffs into the lungs every 4 hours as needed for Wheezing or Shortness of Breath Please provide pt c spacer as well 1 Inhaler 3    atorvastatin (LIPITOR) 10 MG tablet Take 1 tablet by mouth daily 30 tablet 5    Spacer/Aero-Holding Chambers (VALVED HOLDING CHAMBER) DOUGIE USE WITH INHALER AS DIRECTED 1 Device 0    Omega-3 Fatty Acids (FISH OIL) 500 MG CAPS Take 500 mg by mouth daily (Patient not taking: Reported on 12/7/2020) 90 capsule 5     No current facility-administered medications on file prior to visit. Allergies   Allergen Reactions    Bee Venom        OBJECTIVE:  BP (!) 135/97   Pulse 113   Temp 97.2 °F (36.2 °C) (Temporal)   Resp 16   Wt 221 lb 9.6 oz (100.5 kg)   BMI 35.09 kg/m²   BP Readings from Last 2 Encounters:   12/07/20 (!) 135/97   11/02/20 (!) 155/108     Wt Readings from Last 3 Encounters:   12/07/20 221 lb 9.6 oz (100.5 kg)   11/02/20 218 lb 6.4 oz (99.1 kg)   01/28/20 209 lb (94.8 kg)       Physical Exam  Vitals signs and nursing note reviewed. Constitutional:       General: He is not in acute distress. Appearance: Normal appearance. HENT:      Head: Normocephalic and atraumatic.       Right Ear: External ear normal.      Left Ear: External ear Results   Component Value Date    TRIG 134 11/05/2020    TRIG 612 (H) 11/05/2019     Lab Results   Component Value Date    HDL 56 11/05/2020    HDL 61 (H) 11/05/2019     Lab Results   Component Value Date    LDLCALC 51 11/05/2020    [address] see below 11/05/2019     Lab Results   Component Value Date    LABVLDL 27 11/05/2020    LABVLDL see below 11/05/2019     Lab Results   Component Value Date    LABA1C 5.5 10/29/2019         ASSESSMENT/PLAN:  1. Essential hypertension  Initial triage BP was a little improved from LOV. Rpt was much better. Follow up in 3 months  - Blood Pressure KIT; 1 kit by Does not apply route daily  Dispense: 1 kit; Refill: 0    2. Alcohol use  Continue working on alcohol use and decrease slowly. Patient understands risk of continuing. All questions answered  Follow up in 3 months    3. Mixed hyperlipidemia  Continue statin. Discussed use, benefit, and side effects of prescribed medications. Barriers to medication compliance addressed. All patient questions answered. Pt voiced understanding. RTC Return in 3 months (on 3/7/2021), or if symptoms worsen or fail to improve. No future appointments.     Karley Johnson MD  12/7/2020  9:16 AM

## 2020-12-07 ENCOUNTER — OFFICE VISIT (OUTPATIENT)
Dept: PRIMARY CARE CLINIC | Age: 45
End: 2020-12-07
Payer: MEDICARE

## 2020-12-07 VITALS
TEMPERATURE: 97.2 F | DIASTOLIC BLOOD PRESSURE: 97 MMHG | BODY MASS INDEX: 35.09 KG/M2 | SYSTOLIC BLOOD PRESSURE: 135 MMHG | WEIGHT: 221.6 LBS | RESPIRATION RATE: 16 BRPM | HEART RATE: 113 BPM

## 2020-12-07 PROCEDURE — G8417 CALC BMI ABV UP PARAM F/U: HCPCS | Performed by: FAMILY MEDICINE

## 2020-12-07 PROCEDURE — G8484 FLU IMMUNIZE NO ADMIN: HCPCS | Performed by: FAMILY MEDICINE

## 2020-12-07 PROCEDURE — 99213 OFFICE O/P EST LOW 20 MIN: CPT | Performed by: FAMILY MEDICINE

## 2020-12-07 PROCEDURE — 1036F TOBACCO NON-USER: CPT | Performed by: FAMILY MEDICINE

## 2020-12-07 PROCEDURE — G8427 DOCREV CUR MEDS BY ELIG CLIN: HCPCS | Performed by: FAMILY MEDICINE

## 2020-12-07 RX ORDER — BLOOD PRESSURE TEST KIT
1 KIT MISCELLANEOUS DAILY
Qty: 1 KIT | Refills: 0 | Status: SHIPPED | OUTPATIENT
Start: 2020-12-07

## 2020-12-07 ASSESSMENT — ENCOUNTER SYMPTOMS
CHEST TIGHTNESS: 0
WHEEZING: 0
COUGH: 0
DIARRHEA: 0
ABDOMINAL PAIN: 0
SHORTNESS OF BREATH: 0
EYE REDNESS: 0

## 2021-02-02 DIAGNOSIS — E78.1 HYPERTRIGLYCERIDEMIA: ICD-10-CM

## 2021-02-02 DIAGNOSIS — E78.2 MIXED HYPERLIPIDEMIA: ICD-10-CM

## 2021-02-02 DIAGNOSIS — E66.09 NON MORBID OBESITY DUE TO EXCESS CALORIES: ICD-10-CM

## 2021-02-02 RX ORDER — ATORVASTATIN CALCIUM 10 MG/1
10 TABLET, FILM COATED ORAL DAILY
Qty: 30 TABLET | Refills: 5 | Status: SHIPPED | OUTPATIENT
Start: 2021-02-02 | End: 2021-03-09 | Stop reason: SDUPTHER

## 2021-02-04 ENCOUNTER — PATIENT MESSAGE (OUTPATIENT)
Dept: PRIMARY CARE CLINIC | Age: 46
End: 2021-02-04

## 2021-02-04 DIAGNOSIS — E78.2 MIXED HYPERLIPIDEMIA: ICD-10-CM

## 2021-02-04 DIAGNOSIS — I10 ESSENTIAL HYPERTENSION: ICD-10-CM

## 2021-02-04 DIAGNOSIS — J30.89 NON-SEASONAL ALLERGIC RHINITIS, UNSPECIFIED TRIGGER: ICD-10-CM

## 2021-02-04 RX ORDER — ALBUTEROL SULFATE 90 UG/1
2 AEROSOL, METERED RESPIRATORY (INHALATION) EVERY 4 HOURS PRN
Qty: 1 INHALER | Refills: 5 | Status: SHIPPED | OUTPATIENT
Start: 2021-02-04 | End: 2021-03-09 | Stop reason: SDUPTHER

## 2021-02-04 RX ORDER — AMLODIPINE BESYLATE 5 MG/1
5 TABLET ORAL DAILY
Qty: 90 TABLET | Refills: 1 | Status: SHIPPED | OUTPATIENT
Start: 2021-02-04 | End: 2021-05-13 | Stop reason: SDUPTHER

## 2021-02-04 NOTE — TELEPHONE ENCOUNTER
From: Fannie Reyes  To: Yaakov Donovan MD  Sent: 2/4/2021 1:40 PM EST  Subject: Verba Mend Doris Groves i went to get my medicine at [facility] an all they had was Atorvastatin ready. .they didn't have my blood pressure medicine or my inhaler. .can I plz get refills on them thanks

## 2021-03-08 ENCOUNTER — TELEPHONE (OUTPATIENT)
Dept: PRIMARY CARE CLINIC | Age: 46
End: 2021-03-08

## 2021-03-08 DIAGNOSIS — E66.09 NON MORBID OBESITY DUE TO EXCESS CALORIES: ICD-10-CM

## 2021-03-08 DIAGNOSIS — E78.2 MIXED HYPERLIPIDEMIA: ICD-10-CM

## 2021-03-08 DIAGNOSIS — J30.89 NON-SEASONAL ALLERGIC RHINITIS, UNSPECIFIED TRIGGER: ICD-10-CM

## 2021-03-08 DIAGNOSIS — E78.1 HYPERTRIGLYCERIDEMIA: ICD-10-CM

## 2021-03-08 NOTE — TELEPHONE ENCOUNTER
----- Message from Macie Dooleykimberly sent at 3/8/2021 10:46 AM EST -----  Subject: Refill Request    QUESTIONS  Name of Medication? albuterol sulfate HFA (PROVENTIL HFA) 108 (90 Base)   MCG/ACT inhaler  Patient-reported dosage and instructions? Take every two hours as needed  How many days do you have left? 100  [facility]? [id number]  Pharmacy phone number (if available)? [phone]  ---------------------------------------------------------------------------  --------------    Name of Medication? atorvastatin (LIPITOR) 10 MG tablet  Patient-reported dosage and instructions? Take 1 tab po daily  How many days do you have left? 5  [facility]? [id number]  Pharmacy phone number (if available)? [phone]  ---------------------------------------------------------------------------  --------------  Cheron Messing INFO  What is the best way for the office to contact you? OK to leave message on   voicemail  Preferred Call Back Phone Number?  [phone]

## 2021-03-09 RX ORDER — ATORVASTATIN CALCIUM 10 MG/1
10 TABLET, FILM COATED ORAL DAILY
Qty: 30 TABLET | Refills: 5 | Status: SHIPPED | OUTPATIENT
Start: 2021-03-09 | End: 2021-05-13 | Stop reason: SDUPTHER

## 2021-03-09 RX ORDER — ALBUTEROL SULFATE 90 UG/1
2 AEROSOL, METERED RESPIRATORY (INHALATION) EVERY 4 HOURS PRN
Qty: 1 INHALER | Refills: 5 | Status: SHIPPED | OUTPATIENT
Start: 2021-03-09 | End: 2021-05-13 | Stop reason: SDUPTHER

## 2021-05-12 NOTE — PROGRESS NOTES
2021    TELEHEALTH EVALUATION -- Audio/Visual (During ZLV-83 public health emergency)    HPI:    Parrish Ye (:  1975) has requested an audio/video evaluation for the following concern(s):    Alcohol use: Has continued to drink 2-3 tall boys daily to relax. Makes it a point to state that he could stop if he wanted to, he is not an alcoholic, and he does not wake up wanting to drink. HTN  Rx: Norvasc 5 mg daily  Compliant: Unsure  Does not check BP at home. Lab Results   Component Value Date    CREATININE 1.0 2020       HLD  Rx: Atorvastatin 10 mg daily as well as fish oil  The 10-year ASCVD risk score (Gilman Aase., et al., 2013) is: 6.1%    Values used to calculate the score:      Age: 41 years      Sex: Male      Is Non- : Yes      Diabetic: No      Tobacco smoker: No      Systolic Blood Pressure: 638 mmHg      Is BP treated: Yes      HDL Cholesterol: 56 mg/dL      Total Cholesterol: 134 mg/dL    PHQ-9 Total Score: 8 (2021  1:22 PM)  Thoughts that you would be better off dead, or of hurting yourself in some way: 0 (2021  1:22 PM)      Review of Systems   Constitutional: Negative for activity change, chills, fatigue and fever. HENT: Negative for congestion. Eyes: Negative for redness. Respiratory: Negative for cough, chest tightness, shortness of breath and wheezing. Cardiovascular: Negative for chest pain and palpitations. Gastrointestinal: Negative for abdominal pain and diarrhea. Genitourinary: Negative for difficulty urinating. Musculoskeletal: Negative for arthralgias. Skin: Negative for rash. Allergic/Immunologic: Negative for immunocompromised state. Neurological: Negative for dizziness, light-headedness and headaches. Hematological: Negative for adenopathy. Psychiatric/Behavioral: Negative for behavioral problems. Prior to Visit Medications    Medication Sig Taking?  Authorizing Provider   atorvastatin (LIPITOR) 10 MG tablet Take 1 tablet by mouth daily Yes Iván De Oliveira MD   amLODIPine (NORVASC) 5 MG tablet Take 1 tablet by mouth daily Yes Iván De Oliveira MD   Omega-3 Fatty Acids (FISH OIL) 500 MG CAPS Take 500 mg by mouth daily Yes Iván De Oliveira MD   albuterol sulfate HFA (PROVENTIL HFA) 108 (90 Base) MCG/ACT inhaler Inhale 2 puffs into the lungs every 4 hours as needed for Wheezing or Shortness of Breath Please provide pt c spacer as well Yes Iván De Oliveira MD   Blood Pressure KIT 1 kit by Does not apply route daily  Iván De Oliveira MD   cetirizine (ZYRTEC ALLERGY) 10 MG tablet Take 1 tablet by mouth daily  Iván De Oliveira MD   Spacer/Aero-Holding Chambers (VALVED HOLDING CHAMBER) DOUGIE USE WITH INHALER AS DIRECTED  Iván De Oliveira MD       Social History     Tobacco Use    Smoking status: Former Smoker     Packs/day: 1.50     Years: 7.00     Pack years: 10.50     Types: Cigarettes     Quit date: 10/29/1997     Years since quittin.5    Smokeless tobacco: Never Used   Substance Use Topics    Alcohol use: Yes     Alcohol/week: 4.0 standard drinks     Types: 4 Cans of beer per week     Frequency: 4 or more times a week     Drinks per session: 3 or 4     Binge frequency: Never     Comment: Daily.  Drug use: Not Currently        Allergies   Allergen Reactions    Bee Venom    ,   Past Medical History:   Diagnosis Date    Kidney stone    , History reviewed. No pertinent surgical history. ,   Social History     Tobacco Use    Smoking status: Former Smoker     Packs/day: 1.50     Years: 7.00     Pack years: 10.50     Types: Cigarettes     Quit date: 10/29/1997     Years since quittin.5    Smokeless tobacco: Never Used   Substance Use Topics    Alcohol use: Yes     Alcohol/week: 4.0 standard drinks     Types: 4 Cans of beer per week     Frequency: 4 or more times a week     Drinks per session: 3 or 4     Binge frequency: Never     Comment: Daily.     Drug use: Not Currently   , Family History   Problem Relation Age of Onset    Osteoarthritis Mother     Cancer Mother     Asthma Father     Diabetes Sister     Hypertension Sister     Stroke Sister     Seizures Sister    ,   Immunization History   Administered Date(s) Administered    Tdap (Boostrix, Adacel) 06/15/2014   ,   Health Maintenance   Topic Date Due    Hepatitis C screen  Never done    COVID-19 Vaccine (1) Never done    HIV screen  Never done    Flu vaccine (Season Ended) 11/02/2021 (Originally 9/1/2021)    Lipid screen  11/05/2021    DTaP/Tdap/Td vaccine (2 - Td) 06/15/2024    Hepatitis A vaccine  Aged Out    Hepatitis B vaccine  Aged Out    Hib vaccine  Aged Out    Meningococcal (ACWY) vaccine  Aged Out    Pneumococcal 0-64 years Vaccine  Aged Out       PHYSICAL EXAMINATION:  [ INSTRUCTIONS:  \"[x]\" Indicates a positive item  \"[]\" Indicates a negative item  -- DELETE ALL ITEMS NOT EXAMINED]  [x] Alert  [x] Oriented to person/place/time    [x] No apparent distress  [] Toxic appearing    [] Face flushed appearing [x] Sclera clear  [] Lips are cyanotic      [x] Breathing appears normal  [] Appears tachypneic      [] Rash on visible skin    [x] Cranial Nerves II-XII grossly intact    [] Motor grossly intact in visible upper extremities    [] Motor grossly intact in visible lower extremities    [x] Normal Mood  [] Anxious appearing    [] Depressed appearing  [] Confused appearing      Due to this being a TeleHealth encounter, evaluation of the following organ systems is limited: Vitals/Constitutional/EENT/Resp/CV/GI//MS/Neuro/Skin/Heme-Lymph-Imm. ASSESSMENT/PLAN:  1. Hypertriglyceridemia  Continue Lipitor and fish oil. We will update lipids in November  Unsure of compliance  - atorvastatin (LIPITOR) 10 MG tablet; Take 1 tablet by mouth daily  Dispense: 30 tablet; Refill: 5  - Omega-3 Fatty Acids (FISH OIL) 500 MG CAPS; Take 500 mg by mouth daily  Dispense: 90 capsule; Refill: 5    2.  Mixed hyperlipidemia  As above  - atorvastatin (LIPITOR) 10 MG tablet; Take 1 tablet by mouth daily  Dispense: 30 tablet; Refill: 5  - amLODIPine (NORVASC) 5 MG tablet; Take 1 tablet by mouth daily  Dispense: 90 tablet; Refill: 1  - Omega-3 Fatty Acids (FISH OIL) 500 MG CAPS; Take 500 mg by mouth daily  Dispense: 90 capsule; Refill: 5    3. Non morbid obesity due to excess calories  Work on decreasing alcohol slowly  Work on activity level and dietary modifications discussed in depth  - atorvastatin (LIPITOR) 10 MG tablet; Take 1 tablet by mouth daily  Dispense: 30 tablet; Refill: 5    4. Essential hypertension  Continue amlodipine 5 mg  Recommended he follow-up in 4 weeks to gauge blood pressure in the office as well as update labs  - Comprehensive Metabolic Panel; Future  - amLODIPine (NORVASC) 5 MG tablet; Take 1 tablet by mouth daily  Dispense: 90 tablet; Refill: 1    5. Non-seasonal allergic rhinitis, unspecified trigger  Refilled  - albuterol sulfate HFA (PROVENTIL HFA) 108 (90 Base) MCG/ACT inhaler; Inhale 2 puffs into the lungs every 4 hours as needed for Wheezing or Shortness of Breath Please provide pt c spacer as well  Dispense: 1 Inhaler; Refill: 5      Return in about 4 weeks (around 6/10/2021). No future appointments. An  electronic signature was used to authenticate this note. --Yanelis Honeycutt MD on 5/13/2021 at 1:27 PM    {Coding Help -- Use CPT 43493-21964 with EM elements or Time rules for Office Visits. :    11 to 15 minutes were spent on the digital evaluation and management of this patient. Pursuant to the emergency declaration under the [facility]5 waiver authority and the [employer] Act, this Virtual  Visit was conducted, with patient's consent, to reduce the patient's risk of exposure to COVID-19 and provide continuity of care for an established patient.     Services were provided through a video synchronous discussion virtually to substitute for in-person clinic visit.

## 2021-05-13 ENCOUNTER — VIRTUAL VISIT (OUTPATIENT)
Dept: PRIMARY CARE CLINIC | Age: 46
End: 2021-05-13
Payer: MEDICARE

## 2021-05-13 DIAGNOSIS — E66.09 NON MORBID OBESITY DUE TO EXCESS CALORIES: ICD-10-CM

## 2021-05-13 DIAGNOSIS — J30.89 NON-SEASONAL ALLERGIC RHINITIS, UNSPECIFIED TRIGGER: ICD-10-CM

## 2021-05-13 DIAGNOSIS — E78.2 MIXED HYPERLIPIDEMIA: ICD-10-CM

## 2021-05-13 DIAGNOSIS — I10 ESSENTIAL HYPERTENSION: ICD-10-CM

## 2021-05-13 DIAGNOSIS — E78.1 HYPERTRIGLYCERIDEMIA: ICD-10-CM

## 2021-05-13 PROCEDURE — 99214 OFFICE O/P EST MOD 30 MIN: CPT | Performed by: FAMILY MEDICINE

## 2021-05-13 PROCEDURE — 1036F TOBACCO NON-USER: CPT | Performed by: FAMILY MEDICINE

## 2021-05-13 PROCEDURE — G8427 DOCREV CUR MEDS BY ELIG CLIN: HCPCS | Performed by: FAMILY MEDICINE

## 2021-05-13 PROCEDURE — G8417 CALC BMI ABV UP PARAM F/U: HCPCS | Performed by: FAMILY MEDICINE

## 2021-05-13 PROCEDURE — VIRTUALHLTH VIRTUAL HEALTH SAME DAY: Performed by: FAMILY MEDICINE

## 2021-05-13 RX ORDER — ATORVASTATIN CALCIUM 10 MG/1
10 TABLET, FILM COATED ORAL DAILY
Qty: 30 TABLET | Refills: 5 | Status: SHIPPED | OUTPATIENT
Start: 2021-05-13 | End: 2022-01-06 | Stop reason: SDUPTHER

## 2021-05-13 RX ORDER — AMLODIPINE BESYLATE 5 MG/1
5 TABLET ORAL DAILY
Qty: 90 TABLET | Refills: 1 | Status: SHIPPED | OUTPATIENT
Start: 2021-05-13 | End: 2021-06-23

## 2021-05-13 RX ORDER — ALBUTEROL SULFATE 90 UG/1
2 AEROSOL, METERED RESPIRATORY (INHALATION) EVERY 4 HOURS PRN
Qty: 1 INHALER | Refills: 5 | Status: SHIPPED | OUTPATIENT
Start: 2021-05-13 | End: 2022-01-06

## 2021-05-13 RX ORDER — OMEGA-3/DHA/EPA/FISH OIL 60 MG-90MG
500 CAPSULE ORAL DAILY
Qty: 90 CAPSULE | Refills: 5 | Status: SHIPPED | OUTPATIENT
Start: 2021-05-13 | End: 2021-06-23

## 2021-05-13 ASSESSMENT — PATIENT HEALTH QUESTIONNAIRE - PHQ9
2. FEELING DOWN, DEPRESSED OR HOPELESS: 2
SUM OF ALL RESPONSES TO PHQ QUESTIONS 1-9: 8
8. MOVING OR SPEAKING SO SLOWLY THAT OTHER PEOPLE COULD HAVE NOTICED. OR THE OPPOSITE, BEING SO FIGETY OR RESTLESS THAT YOU HAVE BEEN MOVING AROUND A LOT MORE THAN USUAL: 0
SUM OF ALL RESPONSES TO PHQ QUESTIONS 1-9: 8
1. LITTLE INTEREST OR PLEASURE IN DOING THINGS: 1
9. THOUGHTS THAT YOU WOULD BE BETTER OFF DEAD, OR OF HURTING YOURSELF: 0
4. FEELING TIRED OR HAVING LITTLE ENERGY: 1

## 2021-05-13 ASSESSMENT — ENCOUNTER SYMPTOMS
CHEST TIGHTNESS: 0
EYE REDNESS: 0
DIARRHEA: 0
WHEEZING: 0
COUGH: 0
ABDOMINAL PAIN: 0
SHORTNESS OF BREATH: 0

## 2021-05-24 ENCOUNTER — PATIENT MESSAGE (OUTPATIENT)
Dept: PRIMARY CARE CLINIC | Age: 46
End: 2021-05-24

## 2021-05-24 DIAGNOSIS — K21.9 GASTROESOPHAGEAL REFLUX DISEASE WITHOUT ESOPHAGITIS: Primary | ICD-10-CM

## 2021-05-24 NOTE — TELEPHONE ENCOUNTER
From: Raquel Garcia  To: Jorge Luna MD  Sent: 5/24/2021 4:40 PM EDT  Subject: Prescription Question    Heharvey doc. . i been having heartburn at night. . it was keeping me up at night until my girlfriend who has medicine for it gave me some of her famotidine 20 mg. ..an its been working i don't have any heartburn when I take this meds. .. i was wondering if u can plz send me a prescription of these over to my pharmacy. .maybe a refill or two. . cause this really really helps. . thanks doc. Herlinda Castellanos  appreciate it

## 2021-05-25 RX ORDER — FAMOTIDINE 20 MG/1
20 TABLET, FILM COATED ORAL 2 TIMES DAILY
Qty: 60 TABLET | Refills: 3 | Status: SHIPPED | OUTPATIENT
Start: 2021-05-25 | End: 2022-01-06 | Stop reason: ALTCHOICE

## 2021-06-23 ENCOUNTER — OFFICE VISIT (OUTPATIENT)
Dept: PRIMARY CARE CLINIC | Age: 46
End: 2021-06-23

## 2021-06-23 VITALS
TEMPERATURE: 97.9 F | RESPIRATION RATE: 16 BRPM | WEIGHT: 212.6 LBS | DIASTOLIC BLOOD PRESSURE: 88 MMHG | SYSTOLIC BLOOD PRESSURE: 124 MMHG | HEART RATE: 98 BPM | BODY MASS INDEX: 33.67 KG/M2

## 2021-06-23 DIAGNOSIS — M79.89 BILATERAL SWELLING OF FEET: ICD-10-CM

## 2021-06-23 DIAGNOSIS — I10 ESSENTIAL HYPERTENSION: Primary | ICD-10-CM

## 2021-06-23 LAB
A/G RATIO: 0.9 (ref 1.1–2.2)
ALBUMIN SERPL-MCNC: 3.1 G/DL (ref 3.4–5)
ALP BLD-CCNC: 340 U/L (ref 40–129)
ALT SERPL-CCNC: 47 U/L (ref 10–40)
ANION GAP SERPL CALCULATED.3IONS-SCNC: 17 MMOL/L (ref 3–16)
AST SERPL-CCNC: 111 U/L (ref 15–37)
BILIRUB SERPL-MCNC: 1.3 MG/DL (ref 0–1)
BUN BLDV-MCNC: 4 MG/DL (ref 7–20)
CALCIUM SERPL-MCNC: 8.1 MG/DL (ref 8.3–10.6)
CHLORIDE BLD-SCNC: 97 MMOL/L (ref 99–110)
CO2: 20 MMOL/L (ref 21–32)
CREAT SERPL-MCNC: 1.2 MG/DL (ref 0.9–1.3)
GFR AFRICAN AMERICAN: >60
GFR NON-AFRICAN AMERICAN: >60
GLOBULIN: 3.4 G/DL
GLUCOSE BLD-MCNC: 130 MG/DL (ref 70–99)
POTASSIUM SERPL-SCNC: 4.3 MMOL/L (ref 3.5–5.1)
SODIUM BLD-SCNC: 134 MMOL/L (ref 136–145)
TOTAL PROTEIN: 6.5 G/DL (ref 6.4–8.2)

## 2021-06-23 PROCEDURE — 99213 OFFICE O/P EST LOW 20 MIN: CPT | Performed by: FAMILY MEDICINE

## 2021-06-23 RX ORDER — HYDROCHLOROTHIAZIDE 25 MG/1
12.5 TABLET ORAL DAILY
Qty: 90 TABLET | Refills: 1 | Status: SHIPPED | OUTPATIENT
Start: 2021-06-23 | End: 2022-01-06 | Stop reason: SDUPTHER

## 2021-06-23 ASSESSMENT — ENCOUNTER SYMPTOMS
COUGH: 0
CHEST TIGHTNESS: 0
WHEEZING: 0
SHORTNESS OF BREATH: 0
EYE REDNESS: 0
ABDOMINAL PAIN: 0
DIARRHEA: 0

## 2021-06-23 NOTE — PROGRESS NOTES
10. 51     Types: Cigarettes     Quit date: 10/29/1997     Years since quittin.6    Smokeless tobacco: Never Used   Vaping Use    Vaping Use: Never used   Substance Use Topics    Alcohol use: Yes     Alcohol/week: 4.0 standard drinks     Types: 4 Cans of beer per week     Comment: Daily.  Drug use: Not Currently       New Prescriptions    HYDROCHLOROTHIAZIDE (HYDRODIURIL) 25 MG TABLET    Take 0.5 tablets by mouth daily       Meds Prior to visit:  Current Outpatient Medications on File Prior to Visit   Medication Sig Dispense Refill    famotidine (PEPCID) 20 MG tablet Take 1 tablet by mouth 2 times daily 60 tablet 3    atorvastatin (LIPITOR) 10 MG tablet Take 1 tablet by mouth daily 30 tablet 5    albuterol sulfate HFA (PROVENTIL HFA) 108 (90 Base) MCG/ACT inhaler Inhale 2 puffs into the lungs every 4 hours as needed for Wheezing or Shortness of Breath Please provide pt c spacer as well 1 Inhaler 5    Blood Pressure KIT 1 kit by Does not apply route daily 1 kit 0    Spacer/Aero-Holding Chambers (VALVED HOLDING CHAMBER) DOUGIE USE WITH INHALER AS DIRECTED 1 Device 0     No current facility-administered medications on file prior to visit. Allergies   Allergen Reactions    Bee Venom        OBJECTIVE:  /88   Pulse 98   Temp 97.9 °F (36.6 °C) (Temporal)   Resp 16   Wt 212 lb 9.6 oz (96.4 kg)   BMI 33.67 kg/m²   BP Readings from Last 2 Encounters:   21 124/88   20 (!) 135/97     Wt Readings from Last 3 Encounters:   21 212 lb 9.6 oz (96.4 kg)   20 221 lb 9.6 oz (100.5 kg)   20 218 lb 6.4 oz (99.1 kg)       Physical Exam  Vitals reviewed. Constitutional:       General: He is not in acute distress. Appearance: Normal appearance. He is well-developed and normal weight. He is not ill-appearing. HENT:      Head: Normocephalic and atraumatic. Right Ear: Tympanic membrane, ear canal and external ear normal. There is no impacted cerumen.       Left Ear: Tympanic membrane, ear canal and external ear normal. There is no impacted cerumen. Nose: Nose normal. No congestion or rhinorrhea. Mouth/Throat:      Mouth: Mucous membranes are moist.      Pharynx: Oropharynx is clear. No oropharyngeal exudate or posterior oropharyngeal erythema. Eyes:      General: No scleral icterus. Right eye: No discharge. Left eye: No discharge. Extraocular Movements: Extraocular movements intact. Conjunctiva/sclera: Conjunctivae normal.      Pupils: Pupils are equal, round, and reactive to light. Cardiovascular:      Rate and Rhythm: Normal rate and regular rhythm. Pulses: Normal pulses. Heart sounds: Normal heart sounds. No murmur heard. Comments: Normal radial and pedal pulses  Pulmonary:      Effort: Pulmonary effort is normal. No respiratory distress. Breath sounds: Normal breath sounds. No wheezing. Chest:      Chest wall: No tenderness. Abdominal:      General: Bowel sounds are normal. There is no distension. Palpations: Abdomen is soft. There is no mass. Tenderness: There is no abdominal tenderness. Comments: Normal liver and spleen, no organomegaly. Musculoskeletal:         General: Swelling present. No tenderness or deformity. Normal range of motion. Cervical back: Normal range of motion and neck supple. Right lower leg: Edema present. Left lower leg: Edema present. Comments: Range of motion intact in all extremities   Lymphadenopathy:      Cervical: No cervical adenopathy. Skin:     General: Skin is warm and dry. Capillary Refill: Capillary refill takes less than 2 seconds. Findings: No erythema or rash. Neurological:      General: No focal deficit present. Mental Status: He is alert and oriented to person, place, and time. Mental status is at baseline. Sensory: No sensory deficit. Motor: No weakness or abnormal muscle tone.       Coordination: Coordination normal.      Gait: Gait normal.      Deep Tendon Reflexes: Reflexes normal.   Psychiatric:         Mood and Affect: Mood normal.         Behavior: Behavior normal.         Thought Content: Thought content normal.         Judgment: Judgment normal.      Comments:           Lab Results   Component Value Date    WBC 5.8 11/05/2020    HGB 13.6 11/05/2020    HCT 40.6 11/05/2020    MCV 97.3 11/05/2020     11/05/2020     Lab Results   Component Value Date     11/05/2020    K 4.1 11/05/2020    CL 99 11/05/2020    CO2 26 11/05/2020    BUN 7 11/05/2020    CREATININE 1.0 11/05/2020    GLUCOSE 95 11/05/2020    CALCIUM 8.8 11/05/2020    PROT 6.5 11/05/2020    LABALBU 3.6 11/05/2020    BILITOT 0.6 11/05/2020    ALKPHOS 106 11/05/2020    AST 90 (H) 11/05/2020    ALT 94 (H) 11/05/2020    LABGLOM >60 11/05/2020    GFRAA >60 11/05/2020    AGRATIO 1.2 11/05/2020    GLOB 2.9 11/05/2020     Lab Results   Component Value Date    CHOL 134 11/05/2020    CHOL 262 (H) 11/05/2019     Lab Results   Component Value Date    TRIG 134 11/05/2020    TRIG 612 (H) 11/05/2019     Lab Results   Component Value Date    HDL 56 11/05/2020    HDL 61 (H) 11/05/2019     Lab Results   Component Value Date    LDLCALC 51 11/05/2020    [address] see below 11/05/2019     Lab Results   Component Value Date    LABVLDL 27 11/05/2020    LABVLDL see below 11/05/2019     Lab Results   Component Value Date    LABA1C 5.5 10/29/2019         ASSESSMENT/PLAN:  1. Essential hypertension  Stop Norvasc given side effects of bilateral feet swelling. We will instead try HCTZ 12.5 mg daily. Follow-up in 1 to 2 weeks to gauge improvement and check blood pressure  We will also watch salt intake  - hydroCHLOROthiazide (HYDRODIURIL) 25 MG tablet; Take 0.5 tablets by mouth daily  Dispense: 90 tablet; Refill: 1    2.  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 Discussed use, benefit, and side effects of prescribed medications. Barriers to medication compliance addressed. All patient questions answered. Pt voiced understanding. RTC No follow-ups on file.     Future Appointments   Date Time Provider Forrest Moreno   7/8/2021 11:00 AM MD Ricky Sena MD  6/23/2021  11:03 AM

## 2021-06-24 ENCOUNTER — TELEPHONE (OUTPATIENT)
Dept: PRIMARY CARE CLINIC | Age: 46
End: 2021-06-24

## 2021-06-24 DIAGNOSIS — R74.8 ELEVATED LIVER ENZYMES: ICD-10-CM

## 2021-06-24 DIAGNOSIS — Z78.9 ALCOHOL USE: ICD-10-CM

## 2021-06-24 DIAGNOSIS — I10 ESSENTIAL HYPERTENSION: Primary | ICD-10-CM

## 2021-06-24 DIAGNOSIS — E83.51 LOW CALCIUM LEVELS: ICD-10-CM

## 2021-06-24 DIAGNOSIS — R74.8 ELEVATED ALKALINE PHOSPHATASE IN NEWBORN: ICD-10-CM

## 2021-06-24 NOTE — TELEPHONE ENCOUNTER
----- Message from Elnoria Kehr, MD sent at 6/24/2021  3:18 PM EDT -----  Please call patient with results:    -Metabolic panel shows a low sodium and low chloride as well as an elevated glucose. Your kidney function seems to be doing okay. It is hard to say for how long however given your liver function.  -Liver function seems to be showing a damaged pattern from alcohol. Your alkaline phosphatase is also elevated. This means that your liver is having trouble filtering your blood due. I would like you to try and cut back from alcohol the best you can in the next 4 to 6 weeks. Limit yourself to 1 drink a day until you can completely stop. I am placing orders for you to recheck this lab in 6 weeks. Also schedule a follow-up with me at that time. Thank you!   Dr. Keshia Hamilton

## 2021-06-24 NOTE — TELEPHONE ENCOUNTER
----- Message from Crystal Mitchell MD sent at 6/24/2021  3:18 PM EDT -----  Please call patient with results:    -Metabolic panel shows a low sodium and low chloride as well as an elevated glucose. Your kidney function seems to be doing okay. It is hard to say for how long however given your liver function.  -Liver function seems to be showing a damaged pattern from alcohol. Your alkaline phosphatase is also elevated. This means that your liver is having trouble filtering your blood due. I would like you to try and cut back from alcohol the best you can in the next 4 to 6 weeks. Limit yourself to 1 drink a day until you can completely stop. I am placing orders for you to recheck this lab in 6 weeks. Also schedule a follow-up with me at that time. Thank you!   Dr. Luis F Vu

## 2021-08-04 ENCOUNTER — OFFICE VISIT (OUTPATIENT)
Dept: PSYCHOLOGY | Age: 46
End: 2021-08-04

## 2021-08-04 DIAGNOSIS — F32.A DEPRESSION, UNSPECIFIED DEPRESSION TYPE: Primary | ICD-10-CM

## 2021-08-04 PROCEDURE — 99999 PR OFFICE/OUTPT VISIT,PROCEDURE ONLY: CPT | Performed by: PSYCHOLOGIST

## 2021-08-04 NOTE — PATIENT INSTRUCTIONS
1. Call [facility] [place] [facility] for individual psychotherapy:  [address]   [phone]    2.  No further follow up with Dr Stevenson Caban, return as needed

## 2021-08-04 NOTE — PROGRESS NOTES
Time: 8 minutes    S/O: pt seen briefly after he left  frustrated after being asked for updated insurance card. He went downstairs and called insurance to confirm he does in fact have updated information. Had  call pt back to either reschedule with me and/or come back upstairs to at least touch base briefly. I had reviewed his chart and shared that I would have recommended a referral for full dose psychotherapy with current external stressors. Provided some psycho-ed about how psychotherapy could support his stress and therapy treatment options. He was very agreeable to referral and thanked me for still seeing him this morning after all of the confusion today. A: depression, unspecified. Denied any si/hi risk. P: Pt will call referral below asap and understands that if for some reason there is a wait or the new pt appt is 6+ weeks or longer he is always welcome to schedule another bridge appt with me at any time. 1. Call [facility] [place] [facility]) for individual psychotherapy:  [address]   [phone]    2.  No further follow up with Dr Layne Barrientos, return as needed

## 2021-10-02 ENCOUNTER — HOSPITAL ENCOUNTER (EMERGENCY)
Age: 46
Discharge: HOME | End: 2021-10-02
Attending: EMERGENCY MEDICINE
Payer: MEDICARE

## 2021-10-02 VITALS
OXYGEN SATURATION: 99 % | SYSTOLIC BLOOD PRESSURE: 132 MMHG | DIASTOLIC BLOOD PRESSURE: 95 MMHG | HEART RATE: 103 BPM | RESPIRATION RATE: 14 BRPM | TEMPERATURE: 98.3 F | BODY MASS INDEX: 34.68 KG/M2 | HEIGHT: 68 IN | WEIGHT: 228.8 LBS

## 2021-10-02 DIAGNOSIS — S01.01XA LACERATION OF SCALP, INITIAL ENCOUNTER: Primary | ICD-10-CM

## 2021-10-02 PROCEDURE — 99283 EMERGENCY DEPT VISIT LOW MDM: CPT

## 2021-10-02 PROCEDURE — 90715 TDAP VACCINE 7 YRS/> IM: CPT | Performed by: EMERGENCY MEDICINE

## 2021-10-02 PROCEDURE — 90471 IMMUNIZATION ADMIN: CPT | Performed by: EMERGENCY MEDICINE

## 2021-10-02 PROCEDURE — 12001 RPR S/N/AX/GEN/TRNK 2.5CM/<: CPT

## 2021-10-02 PROCEDURE — 6360000002 HC RX W HCPCS: Performed by: EMERGENCY MEDICINE

## 2021-10-02 RX ORDER — BACITRACIN ZINC AND POLYMYXIN B SULFATE 500; 1000 [USP'U]/G; [USP'U]/G
OINTMENT TOPICAL
Qty: 3 G | Refills: 1 | Status: SHIPPED | OUTPATIENT
Start: 2021-10-02 | End: 2021-10-09

## 2021-10-02 RX ADMIN — TETANUS TOXOID, REDUCED DIPHTHERIA TOXOID AND ACELLULAR PERTUSSIS VACCINE, ADSORBED 0.5 ML: 5; 2.5; 8; 8; 2.5 SUSPENSION INTRAMUSCULAR at 22:53

## 2021-10-02 ASSESSMENT — PAIN DESCRIPTION - DESCRIPTORS: DESCRIPTORS: SORE

## 2021-10-02 ASSESSMENT — PAIN DESCRIPTION - PAIN TYPE: TYPE: ACUTE PAIN

## 2021-10-02 ASSESSMENT — PAIN DESCRIPTION - ORIENTATION: ORIENTATION: RIGHT;POSTERIOR

## 2021-10-02 ASSESSMENT — PAIN SCALES - GENERAL: PAINLEVEL_OUTOF10: 4

## 2021-10-02 ASSESSMENT — PAIN DESCRIPTION - LOCATION: LOCATION: HEAD
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
## 2021-10-07 ENCOUNTER — OFFICE VISIT (OUTPATIENT)
Dept: PRIMARY CARE CLINIC | Age: 46
End: 2021-10-07
Payer: MEDICARE

## 2021-10-07 VITALS
HEART RATE: 104 BPM | BODY MASS INDEX: 34.15 KG/M2 | DIASTOLIC BLOOD PRESSURE: 88 MMHG | RESPIRATION RATE: 16 BRPM | TEMPERATURE: 98.4 F | SYSTOLIC BLOOD PRESSURE: 129 MMHG | WEIGHT: 224.6 LBS

## 2021-10-07 DIAGNOSIS — Z48.02 ENCOUNTER FOR STAPLE REMOVAL: Primary | ICD-10-CM

## 2021-10-07 PROCEDURE — G8417 CALC BMI ABV UP PARAM F/U: HCPCS | Performed by: FAMILY MEDICINE

## 2021-10-07 PROCEDURE — 1036F TOBACCO NON-USER: CPT | Performed by: FAMILY MEDICINE

## 2021-10-07 PROCEDURE — G8427 DOCREV CUR MEDS BY ELIG CLIN: HCPCS | Performed by: FAMILY MEDICINE

## 2021-10-07 PROCEDURE — G8484 FLU IMMUNIZE NO ADMIN: HCPCS | Performed by: FAMILY MEDICINE

## 2021-10-07 PROCEDURE — 99212 OFFICE O/P EST SF 10 MIN: CPT | Performed by: FAMILY MEDICINE

## 2022-01-06 ENCOUNTER — VIRTUAL VISIT (OUTPATIENT)
Dept: PRIMARY CARE CLINIC | Age: 47
End: 2022-01-06
Payer: MEDICARE

## 2022-01-06 DIAGNOSIS — K21.9 GASTROESOPHAGEAL REFLUX DISEASE WITHOUT ESOPHAGITIS: ICD-10-CM

## 2022-01-06 DIAGNOSIS — R74.8 ELEVATED LIVER ENZYMES: ICD-10-CM

## 2022-01-06 DIAGNOSIS — Z78.9 ALCOHOL USE: ICD-10-CM

## 2022-01-06 DIAGNOSIS — E78.2 MIXED HYPERLIPIDEMIA: ICD-10-CM

## 2022-01-06 DIAGNOSIS — I10 ESSENTIAL HYPERTENSION: Primary | ICD-10-CM

## 2022-01-06 DIAGNOSIS — J30.89 NON-SEASONAL ALLERGIC RHINITIS, UNSPECIFIED TRIGGER: ICD-10-CM

## 2022-01-06 PROCEDURE — 99214 OFFICE O/P EST MOD 30 MIN: CPT | Performed by: FAMILY MEDICINE

## 2022-01-06 PROCEDURE — G8417 CALC BMI ABV UP PARAM F/U: HCPCS | Performed by: FAMILY MEDICINE

## 2022-01-06 PROCEDURE — G8427 DOCREV CUR MEDS BY ELIG CLIN: HCPCS | Performed by: FAMILY MEDICINE

## 2022-01-06 PROCEDURE — 1036F TOBACCO NON-USER: CPT | Performed by: FAMILY MEDICINE

## 2022-01-06 PROCEDURE — G8484 FLU IMMUNIZE NO ADMIN: HCPCS | Performed by: FAMILY MEDICINE

## 2022-01-06 PROCEDURE — VIRTUALHLTH VIRTUAL HEALTH SAME DAY: Performed by: FAMILY MEDICINE

## 2022-01-06 RX ORDER — FAMOTIDINE 20 MG/1
20 TABLET, FILM COATED ORAL 2 TIMES DAILY
Qty: 180 TABLET | Refills: 3 | Status: SHIPPED | OUTPATIENT
Start: 2022-01-06 | End: 2022-04-06

## 2022-01-06 RX ORDER — HYDROCHLOROTHIAZIDE 25 MG/1
12.5 TABLET ORAL DAILY
Qty: 90 TABLET | Refills: 1 | Status: SHIPPED | OUTPATIENT
Start: 2022-01-06

## 2022-01-06 RX ORDER — ATORVASTATIN CALCIUM 10 MG/1
10 TABLET, FILM COATED ORAL DAILY
Qty: 90 TABLET | Refills: 3 | Status: SHIPPED | OUTPATIENT
Start: 2022-01-06

## 2022-01-06 RX ORDER — ALBUTEROL SULFATE 90 UG/1
2 AEROSOL, METERED RESPIRATORY (INHALATION) EVERY 4 HOURS PRN
Qty: 1 EACH | Refills: 3 | Status: SHIPPED | OUTPATIENT
Start: 2022-01-06 | End: 2023-01-06

## 2022-01-06 SDOH — ECONOMIC STABILITY: FOOD INSECURITY: WITHIN THE PAST 12 MONTHS, YOU WORRIED THAT YOUR FOOD WOULD RUN OUT BEFORE YOU GOT MONEY TO BUY MORE.: NEVER TRUE

## 2022-01-06 SDOH — ECONOMIC STABILITY: FOOD INSECURITY: WITHIN THE PAST 12 MONTHS, THE FOOD YOU BOUGHT JUST DIDN'T LAST AND YOU DIDN'T HAVE MONEY TO GET MORE.: NEVER TRUE

## 2022-01-06 ASSESSMENT — ENCOUNTER SYMPTOMS
SHORTNESS OF BREATH: 0
ABDOMINAL PAIN: 0
COUGH: 0
DIARRHEA: 0
CHEST TIGHTNESS: 0
WHEEZING: 0
EYE REDNESS: 0

## 2022-01-06 ASSESSMENT — SOCIAL DETERMINANTS OF HEALTH (SDOH): HOW HARD IS IT FOR YOU TO PAY FOR THE VERY BASICS LIKE FOOD, HOUSING, MEDICAL CARE, AND HEATING?: NOT HARD AT ALL

## 2022-01-06 NOTE — PROGRESS NOTES
2022    TELEHEALTH EVALUATION -- Audio/Visual (During PKJTA-48 public health emergency)    HPI:    Rl Aguiar (:  1975) has requested an audio/video evaluation for the following concern(s):    Alc use  Still drinking 1.5 tall boys but only 3-4 days per week and maybe on weekends. Discussed the importance in the setting of elevated liver enzymes. HTN  Wt Readings from Last 3 Encounters:   10/07/21 224 lb 9.6 oz (101.9 kg)   10/02/21 228 lb 12.8 oz (103.8 kg)   21 212 lb 9.6 oz (96.4 kg)     BP Readings from Last 3 Encounters:   10/07/21 129/88   10/02/21 (!) 132/95   21 124/88   Rx: HCTZ    Dad had colon cancer. Diagnosed when he was in his 62. Discussed screening and he would like to think about it. HLD  Rx: statin  The 10-year ASCVD risk score (Katia Agosto et al., 2013) is: 6%    Values used to calculate the score:      Age: 40 years      Sex: Male      Is Non- : Yes      Diabetic: No      Tobacco smoker: No      Systolic Blood Pressure: 345 mmHg      Is BP treated: Yes      HDL Cholesterol: 56 mg/dL      Total Cholesterol: 134 mg/dL    Need refill of his albuterol inh and GERD medicine       Review of Systems   Constitutional: Negative for activity change, chills, fatigue and fever. HENT: Negative for congestion. Eyes: Negative for redness. Respiratory: Negative for cough, chest tightness, shortness of breath and wheezing. Cardiovascular: Negative for chest pain and palpitations. Gastrointestinal: Negative for abdominal pain and diarrhea. Genitourinary: Negative for difficulty urinating. Musculoskeletal: Negative for arthralgias. Skin: Negative for rash. Allergic/Immunologic: Negative for immunocompromised state. Neurological: Negative for dizziness, light-headedness and headaches. Hematological: Negative for adenopathy. Psychiatric/Behavioral: Negative for behavioral problems.        Prior to Visit Medications    Medication Sig Taking? Authorizing Provider   famotidine (PEPCID) 20 MG tablet Take 1 tablet by mouth 2 times daily Yes Son Horner MD   atorvastatin (LIPITOR) 10 MG tablet Take 1 tablet by mouth daily Yes Son Horner MD   hydroCHLOROthiazide (HYDRODIURIL) 25 MG tablet Take 0.5 tablets by mouth daily Yes Son Horner MD   albuterol sulfate HFA (PROVENTIL HFA) 108 (90 Base) MCG/ACT inhaler Inhale 2 puffs into the lungs every 4 hours as needed for Wheezing or Shortness of Breath Please provide pt c spacer as well Yes Son Horner MD   Blood Pressure KIT 1 kit by Does not apply route daily Yes Son Horner MD   Spacer/Aero-Holding Chambers (VALVED HOLDING CHAMBER) DOUGIE USE WITH INHALER AS DIRECTED Yes Son Horner MD       Social History     Tobacco Use    Smoking status: Former Smoker     Packs/day: 1.50     Years: 7.00     Pack years: 10.50     Types: Cigarettes     Quit date: 10/29/1997     Years since quittin.2    Smokeless tobacco: Never Used   Vaping Use    Vaping Use: Never used   Substance Use Topics    Alcohol use: Yes     Alcohol/week: 4.0 standard drinks     Types: 4 Cans of beer per week     Comment: Daily.  Drug use: Not Currently        Allergies   Allergen Reactions    Bee Venom    ,   Past Medical History:   Diagnosis Date    Kidney stone    , History reviewed. No pertinent surgical history. ,   Social History     Tobacco Use    Smoking status: Former Smoker     Packs/day: 1.50     Years: 7.00     Pack years: 10.50     Types: Cigarettes     Quit date: 10/29/1997     Years since quittin.2    Smokeless tobacco: Never Used   Vaping Use    Vaping Use: Never used   Substance Use Topics    Alcohol use: Yes     Alcohol/week: 4.0 standard drinks     Types: 4 Cans of beer per week     Comment: Daily.     Drug use: Not Currently   ,   Family History   Problem Relation Age of Onset    Osteoarthritis Mother     Cancer Mother     Asthma Father     Diabetes Sister     Hypertension Sister     Stroke Sister     Seizures Sister    ,   Immunization History   Administered Date(s) Administered    Tdap (Boostrix, Adacel) 06/15/2014, 10/02/2021   ,   Health Maintenance   Topic Date Due    Hepatitis C screen  Never done    COVID-19 Vaccine (1) Never done    HIV screen  Never done    Colon cancer screen colonoscopy  Never done    Flu vaccine (1) Never done    Lipid screen  11/05/2021    Depression Monitoring  05/13/2022    Potassium monitoring  06/23/2022    Creatinine monitoring  06/23/2022    Diabetes screen  10/29/2022    DTaP/Tdap/Td vaccine (3 - Td or Tdap) 10/02/2031    Hepatitis A vaccine  Aged Out    Hepatitis B vaccine  Aged Out    Hib vaccine  Aged Out    Meningococcal (ACWY) vaccine  Aged Out    Pneumococcal 0-64 years Vaccine  Aged Out       PHYSICAL EXAMINATION:  [ INSTRUCTIONS:  \"[x]\" Indicates a positive item  \"[]\" Indicates a negative item  -- DELETE ALL ITEMS NOT EXAMINED]  [x] Alert  [x] Oriented to person/place/time    [x] No apparent distress  [] Toxic appearing    [] Face flushed appearing [x] Sclera clear  [] Lips are cyanotic      [x] Breathing appears normal  [] Appears tachypneic      [] Rash on visible skin    [x] Cranial Nerves II-XII grossly intact    [] Motor grossly intact in visible upper extremities    [] Motor grossly intact in visible lower extremities    [x] Normal Mood  [] Anxious appearing    [] Depressed appearing  [] Confused appearing        Due to this being a TeleHealth encounter, evaluation of the following organ systems is limited: Vitals/Constitutional/EENT/Resp/CV/GI//MS/Neuro/Skin/Heme-Lymph-Imm. ASSESSMENT/PLAN:  1.  Essential hypertension  Update labs  Refill HCTZ  Does not check blood pressure at home so I am not sure how to monitor this virtually  Agreed to come in in about 1 month for physical, blood pressure check and to update his labs which I have made active  - Comprehensive Metabolic Panel; Future  - CBC Auto Differential; Future  - hydroCHLOROthiazide (HYDRODIURIL) 25 MG tablet; Take 0.5 tablets by mouth daily  Dispense: 90 tablet; Refill: 1    2. Alcohol use  Continues to drink in the setting of elevated liver enzymes and alk phos  Discussed importance of cutting back  All questions answered and patient understands    3. Elevated liver enzymes  As above  - Comprehensive Metabolic Panel; Future    4. Mixed hyperlipidemia  Update labs and continue statin  - Comprehensive Metabolic Panel; Future  - atorvastatin (LIPITOR) 10 MG tablet; Take 1 tablet by mouth daily  Dispense: 90 tablet; Refill: 3    5. Gastroesophageal reflux disease without esophagitis  Refilled  - famotidine (PEPCID) 20 MG tablet; Take 1 tablet by mouth 2 times daily  Dispense: 180 tablet; Refill: 3    6. Non-seasonal allergic rhinitis, unspecified trigger  Refilled  - albuterol sulfate HFA (PROVENTIL HFA) 108 (90 Base) MCG/ACT inhaler; Inhale 2 puffs into the lungs every 4 hours as needed for Wheezing or Shortness of Breath Please provide pt c spacer as well  Dispense: 1 each; Refill: 3      No follow-ups on file. No future appointments. An  electronic signature was used to authenticate this note. --Bibi Cruz MD on 1/6/2022 at 11:35 AM    {Coding Help -- Use CPT 91247-02807 with EM elements or Time rules for Office Visits. :    >20 minutes were spent on the digital evaluation and management of this patient. Pursuant to the emergency declaration under the [facility] waiver authority and the [employer] Act, this Virtual  Visit was conducted, with patient's consent, to reduce the patient's risk of exposure to COVID-19 and provide continuity of care for an established patient. Services were provided through a video synchronous discussion virtually to substitute for in-person clinic visit.

## 2022-03-25 ENCOUNTER — TELEPHONE (OUTPATIENT)
Dept: PRIMARY CARE CLINIC | Age: 47
End: 2022-03-25

## 2022-03-25 NOTE — TELEPHONE ENCOUNTER
Pt said he played football with his nephew and is hurting all over  He would like to know if something can be called in.    [facility], [address] [place] [address] -  [phone]    Please call when sent

## 2022-03-25 NOTE — TELEPHONE ENCOUNTER
Called and informed he did this over 2 weeks ago. He has been taking ibu 200 mg and it does not seem to be helping but if you recommend he keep using this he will.

## 2022-03-28 NOTE — TELEPHONE ENCOUNTER
He can take 600 to 800 mg up to twice a day. If he feels he needs a exam, he can make an appt.      Dr. Cody Toro

## 2022-04-06 ENCOUNTER — TELEMEDICINE (OUTPATIENT)
Dept: PRIMARY CARE CLINIC | Age: 47
End: 2022-04-06
Payer: MEDICARE

## 2022-04-06 DIAGNOSIS — Z78.9 ALCOHOL USE: Primary | ICD-10-CM

## 2022-04-06 DIAGNOSIS — E78.2 MIXED HYPERLIPIDEMIA: ICD-10-CM

## 2022-04-06 DIAGNOSIS — I10 ESSENTIAL HYPERTENSION: ICD-10-CM

## 2022-04-06 PROCEDURE — 1036F TOBACCO NON-USER: CPT | Performed by: FAMILY MEDICINE

## 2022-04-06 PROCEDURE — 99214 OFFICE O/P EST MOD 30 MIN: CPT | Performed by: FAMILY MEDICINE

## 2022-04-06 PROCEDURE — G8427 DOCREV CUR MEDS BY ELIG CLIN: HCPCS | Performed by: FAMILY MEDICINE

## 2022-04-06 PROCEDURE — G8417 CALC BMI ABV UP PARAM F/U: HCPCS | Performed by: FAMILY MEDICINE

## 2022-04-06 ASSESSMENT — ENCOUNTER SYMPTOMS
ABDOMINAL PAIN: 0
EYE REDNESS: 0
BACK PAIN: 1
CHEST TIGHTNESS: 0
DIARRHEA: 0
WHEEZING: 0
COUGH: 0
SHORTNESS OF BREATH: 0

## 2022-04-06 NOTE — PROGRESS NOTES
2022    TELEHEALTH EVALUATION -- Audio/Visual (During AZNNO-03 public health emergency)    HPI:    Martine Snow (:  1975) has requested an audio/video evaluation for the following concern(s):    Wt Readings from Last 3 Encounters:   10/07/21 224 lb 9.6 oz (101.9 kg)   10/02/21 228 lb 12.8 oz (103.8 kg)   21 212 lb 9.6 oz (96.4 kg)   He states he has lost weight but not sure how much. He has been working on alcohol use  He has not had a drink in weeks! Not checking blood pressure at home. Taking his medications daily. Is trying to eat healthier. Review of Systems   Constitutional: Negative for activity change, chills, fatigue and fever. HENT: Negative for congestion. Eyes: Negative for redness. Respiratory: Negative for cough, chest tightness, shortness of breath and wheezing. Cardiovascular: Negative for chest pain and palpitations. Gastrointestinal: Negative for abdominal pain and diarrhea. Genitourinary: Negative for difficulty urinating. Musculoskeletal: Positive for arthralgias and back pain. Skin: Negative for rash. Allergic/Immunologic: Negative for immunocompromised state. Neurological: Negative for dizziness, light-headedness and headaches. Hematological: Negative for adenopathy. Psychiatric/Behavioral: Negative for behavioral problems. Prior to Visit Medications    Medication Sig Taking?  Authorizing Provider   atorvastatin (LIPITOR) 10 MG tablet Take 1 tablet by mouth daily Yes Gennaro Bejarano MD   hydroCHLOROthiazide (HYDRODIURIL) 25 MG tablet Take 0.5 tablets by mouth daily Yes Gennaro Bejarano MD   albuterol sulfate HFA (PROVENTIL HFA) 108 (90 Base) MCG/ACT inhaler Inhale 2 puffs into the lungs every 4 hours as needed for Wheezing or Shortness of Breath Please provide pt c spacer as well Yes Gennaro Bejarano MD   Blood Pressure KIT 1 kit by Does not apply route daily Yes Gennaro Bejarano MD   Spacer/Aero-Holding Patricia Mueller (VALVED HOLDING CHAMBER) DOUGIE USE WITH INHALER AS DIRECTED Yes Deirdre Phipps MD       Social History     Tobacco Use    Smoking status: Former Smoker     Packs/day: 1.50     Years: 7.00     Pack years: 10.50     Types: Cigarettes     Quit date: 10/29/1997     Years since quittin.4    Smokeless tobacco: Never Used   Vaping Use    Vaping Use: Never used   Substance Use Topics    Alcohol use: Yes     Alcohol/week: 4.0 standard drinks     Types: 4 Cans of beer per week     Comment: Daily.  Drug use: Not Currently        Allergies   Allergen Reactions    Bee Venom    ,   Past Medical History:   Diagnosis Date    Kidney stone    , History reviewed. No pertinent surgical history. ,   Social History     Tobacco Use    Smoking status: Former Smoker     Packs/day: 1.50     Years: 7.00     Pack years: 10.50     Types: Cigarettes     Quit date: 10/29/1997     Years since quittin.4    Smokeless tobacco: Never Used   Vaping Use    Vaping Use: Never used   Substance Use Topics    Alcohol use: Yes     Alcohol/week: 4.0 standard drinks     Types: 4 Cans of beer per week     Comment: Daily.     Drug use: Not Currently   ,   Family History   Problem Relation Age of Onset    Osteoarthritis Mother     Cancer Mother     Asthma Father     Diabetes Sister     Hypertension Sister     Stroke Sister     Seizures Sister    ,   Immunization History   Administered Date(s) Administered    Tdap (Boostrix, Adacel) 06/15/2014, 10/02/2021   ,   Health Maintenance   Topic Date Due    Hepatitis C screen  Never done    COVID-19 Vaccine (1) Never done    HIV screen  Never done    Colorectal Cancer Screen  Never done    Lipid screen  2021    Depression Monitoring  2022    Potassium monitoring  2022    Creatinine monitoring  2022    Flu vaccine (Season Ended) 2022    Diabetes screen  10/29/2022    DTaP/Tdap/Td vaccine (3 - Td or Tdap) 10/02/2031    Hepatitis A vaccine  Aged Out  Hepatitis B vaccine  Aged Out    Hib vaccine  Aged Out    Meningococcal (ACWY) vaccine  Aged Out    Pneumococcal 0-64 years Vaccine  Aged Out       PHYSICAL EXAMINATION:  [ INSTRUCTIONS:  \"[x]\" Indicates a positive item  \"[]\" Indicates a negative item  -- DELETE ALL ITEMS NOT EXAMINED]  [x] Alert  [x] Oriented to person/place/time    [x] No apparent distress  [] Toxic appearing    [] Face flushed appearing [x] Sclera clear  [] Lips are cyanotic      [x] Breathing appears normal  [] Appears tachypneic      [] Rash on visible skin    [x] Cranial Nerves II-XII grossly intact    [] Motor grossly intact in visible upper extremities    [] Motor grossly intact in visible lower extremities    [x] Normal Mood  [] Anxious appearing    [] Depressed appearing  [] Confused appearing      Due to this being a TeleHealth encounter, evaluation of the following organ systems is limited: Vitals/Constitutional/EENT/Resp/CV/GI//MS/Neuro/Skin/Heme-Lymph-Imm. ASSESSMENT/PLAN:  1. Alcohol use  Has stopped drinking  Cut back weeks ago and last drink was in Oct 2021  Will continue to work on this and stay away from bad influences  Feels better on the inside. Will rpt LFTs at follow up  Follow up in 3 months to continue to monitor     2. Essential hypertension  Not checking at home  Taking HCTZ daily  Follow up in 3 months to gauge improvement and continue to monitor    3. Mixed hyperlipidemia  Continue lipitor 10 mg  Will inc at follow up after we rpt lipid panel if needed  Follow up in 3 months to continue to monitor     No follow-ups on file. No future appointments. An  electronic signature was used to authenticate this note. --Betsy Livingston MD on 4/6/2022 at 10:36 AM    {Coding Help -- Use CPT 63953-92447 with EM elements or Time rules for Office Visits. :    >20 minutes were spent on the digital evaluation and management of this patient.     Pursuant to the emergency declaration under the [address] and the National Emergencies Act, [address] [facility] waiver authority and the [employer] Act, this Virtual  Visit was conducted, with patient's consent, to reduce the patient's risk of exposure to COVID-19 and provide continuity of care for an established patient. Services were provided through a video synchronous discussion virtually to substitute for in-person clinic visit.

## 2022-07-20 ENCOUNTER — TELEMEDICINE (OUTPATIENT)
Dept: PRIMARY CARE CLINIC | Age: 47
End: 2022-07-20
Payer: MEDICARE

## 2022-07-20 DIAGNOSIS — E78.2 MIXED HYPERLIPIDEMIA: ICD-10-CM

## 2022-07-20 DIAGNOSIS — Z78.9 ALCOHOL USE: ICD-10-CM

## 2022-07-20 DIAGNOSIS — E66.09 CLASS 1 OBESITY DUE TO EXCESS CALORIES WITH SERIOUS COMORBIDITY AND BODY MASS INDEX (BMI) OF 34.0 TO 34.9 IN ADULT: ICD-10-CM

## 2022-07-20 DIAGNOSIS — I10 ESSENTIAL HYPERTENSION: Primary | ICD-10-CM

## 2022-07-20 PROCEDURE — G8427 DOCREV CUR MEDS BY ELIG CLIN: HCPCS | Performed by: FAMILY MEDICINE

## 2022-07-20 PROCEDURE — G8417 CALC BMI ABV UP PARAM F/U: HCPCS | Performed by: FAMILY MEDICINE

## 2022-07-20 PROCEDURE — 1036F TOBACCO NON-USER: CPT | Performed by: FAMILY MEDICINE

## 2022-07-20 PROCEDURE — 99214 OFFICE O/P EST MOD 30 MIN: CPT | Performed by: FAMILY MEDICINE

## 2022-07-20 ASSESSMENT — PATIENT HEALTH QUESTIONNAIRE - PHQ9
2. FEELING DOWN, DEPRESSED OR HOPELESS: 0
5. POOR APPETITE OR OVEREATING: 0
SUM OF ALL RESPONSES TO PHQ QUESTIONS 1-9: 0
6. FEELING BAD ABOUT YOURSELF - OR THAT YOU ARE A FAILURE OR HAVE LET YOURSELF OR YOUR FAMILY DOWN: 0
1. LITTLE INTEREST OR PLEASURE IN DOING THINGS: 0
SUM OF ALL RESPONSES TO PHQ QUESTIONS 1-9: 0
8. MOVING OR SPEAKING SO SLOWLY THAT OTHER PEOPLE COULD HAVE NOTICED. OR THE OPPOSITE, BEING SO FIGETY OR RESTLESS THAT YOU HAVE BEEN MOVING AROUND A LOT MORE THAN USUAL: 0
7. TROUBLE CONCENTRATING ON THINGS, SUCH AS READING THE NEWSPAPER OR WATCHING TELEVISION: 0
3. TROUBLE FALLING OR STAYING ASLEEP: 0
SUM OF ALL RESPONSES TO PHQ QUESTIONS 1-9: 0
SUM OF ALL RESPONSES TO PHQ9 QUESTIONS 1 & 2: 0
4. FEELING TIRED OR HAVING LITTLE ENERGY: 0
9. THOUGHTS THAT YOU WOULD BE BETTER OFF DEAD, OR OF HURTING YOURSELF: 0
SUM OF ALL RESPONSES TO PHQ QUESTIONS 1-9: 0

## 2022-07-20 ASSESSMENT — ENCOUNTER SYMPTOMS
EYE REDNESS: 0
CHEST TIGHTNESS: 0
WHEEZING: 0
DIARRHEA: 0
ABDOMINAL PAIN: 0
COUGH: 0
SHORTNESS OF BREATH: 0

## 2022-07-20 NOTE — PROGRESS NOTES
2022    TELEHEALTH EVALUATION -- Audio/Visual (During ZDFPS-67 public health emergency)    HPI:    Lester Morgan (:  1975) has requested an audio/video evaluation for the following concern(s):    Discussed needing to see him in the office setting to check BP. He understands. Believes he lost weight  BP at home is in the 114/82. HCTZ 12.5 mg daily    The 10-year ASCVD risk score (Yogesh Guevara., et al., 2013) is: 6.3%    Values used to calculate the score:      Age: 56 years      Sex: Male      Is Non- : Yes      Diabetic: No      Tobacco smoker: No      Systolic Blood Pressure: 824 mmHg      Is BP treated: Yes      HDL Cholesterol: 56 mg/dL      Total Cholesterol: 134 mg/dL  Taking statin, doing well. Breathing well overall with albuterol as needed. Using this maybe once a month, especially if it is hot outside. Aleksandar Burgess is still drinking alcohol and has increased use to 2 tallboys every other night. Review of Systems   Constitutional:  Negative for activity change, chills, fatigue and fever. HENT:  Negative for congestion. Eyes:  Negative for redness. Respiratory:  Negative for cough, chest tightness, shortness of breath and wheezing. Cardiovascular:  Negative for chest pain and palpitations. Gastrointestinal:  Negative for abdominal pain and diarrhea. Genitourinary:  Negative for difficulty urinating. Musculoskeletal:  Negative for arthralgias. Skin:  Negative for rash. Allergic/Immunologic: Negative for immunocompromised state. Neurological:  Negative for dizziness, light-headedness and headaches. Hematological:  Negative for adenopathy. Psychiatric/Behavioral:  Negative for behavioral problems. Prior to Visit Medications    Medication Sig Taking?  Authorizing Provider   atorvastatin (LIPITOR) 10 MG tablet Take 1 tablet by mouth daily Yes Chun Shook MD   hydroCHLOROthiazide (HYDRODIURIL) 25 MG tablet Take 0.5 tablets by mouth daily Yes Alcira Friedman MD   albuterol sulfate HFA (PROVENTIL HFA) 108 (90 Base) MCG/ACT inhaler Inhale 2 puffs into the lungs every 4 hours as needed for Wheezing or Shortness of Breath Please provide pt c spacer as well Yes Alcira Friedman MD   Blood Pressure KIT 1 kit by Does not apply route daily Yes Alcira Friedman MD   Spacer/Aero-Holding Chambers (VALVED HOLDING CHAMBER) DOUGIE USE WITH INHALER AS DIRECTED Yes Alcira Friedman MD       Social History     Tobacco Use    Smoking status: Former     Packs/day: 1.50     Years: 7.00     Pack years: 10.50     Types: Cigarettes     Quit date: 10/29/1997     Years since quittin.7    Smokeless tobacco: Never   Vaping Use    Vaping Use: Never used   Substance Use Topics    Alcohol use: Yes     Alcohol/week: 4.0 standard drinks     Types: 4 Cans of beer per week     Comment: Daily. Drug use: Not Currently        Allergies   Allergen Reactions    Bee Venom    ,   Past Medical History:   Diagnosis Date    Kidney stone    , History reviewed. No pertinent surgical history. ,   Social History     Tobacco Use    Smoking status: Former     Packs/day: 1.50     Years: 7.00     Pack years: 10.50     Types: Cigarettes     Quit date: 10/29/1997     Years since quittin.7    Smokeless tobacco: Never   Vaping Use    Vaping Use: Never used   Substance Use Topics    Alcohol use: Yes     Alcohol/week: 4.0 standard drinks     Types: 4 Cans of beer per week     Comment: Daily.     Drug use: Not Currently   ,   Family History   Problem Relation Age of Onset    Osteoarthritis Mother     Cancer Mother     Asthma Father     Diabetes Sister     Hypertension Sister     Stroke Sister     Seizures Sister    ,   Immunization History   Administered Date(s) Administered    Tdap (Boostrix, Adacel) 06/15/2014, 10/02/2021   ,   Health Maintenance   Topic Date Due    COVID-19 Vaccine (1) Never done    HIV screen  Never done    Hepatitis C screen  Never done    Colorectal Cancer Screen  Never done    Lipids  11/05/2021    Depression Monitoring  05/13/2022    Flu vaccine (1) 09/01/2022    Diabetes screen  10/29/2022    DTaP/Tdap/Td vaccine (3 - Td or Tdap) 10/02/2031    Hepatitis A vaccine  Aged Out    Hepatitis B vaccine  Aged Out    Hib vaccine  Aged Out    Meningococcal (ACWY) vaccine  Aged Out    Pneumococcal 0-64 years Vaccine  Aged Out       PHYSICAL EXAMINATION:  [ INSTRUCTIONS:  \"[x]\" Indicates a positive item  \"[]\" Indicates a negative item  -- DELETE ALL ITEMS NOT EXAMINED]  [x] Alert  [x] Oriented to person/place/time    [x] No apparent distress  [] Toxic appearing    [] Face flushed appearing [x] Sclera clear  [] Lips are cyanotic      [x] Breathing appears normal  [] Appears tachypneic      [] Rash on visible skin    [x] Cranial Nerves II-XII grossly intact    [] Motor grossly intact in visible upper extremities    [] Motor grossly intact in visible lower extremities    [x] Normal Mood  [] Anxious appearing    [] Depressed appearing  [] Confused appearing        Due to this being a TeleHealth encounter, evaluation of the following organ systems is limited: Vitals/Constitutional/EENT/Resp/CV/GI//MS/Neuro/Skin/Heme-Lymph-Imm. ASSESSMENT/PLAN:  1. Essential hypertension  Home numbers are controlled per patient   Continue HCTZ  Update labs and follow up in 3 months to gauge improvement and titrate med accordingly  - Comprehensive Metabolic Panel; Future  - CBC with Auto Differential; Future  - Hemoglobin A1C; Future    2. Mixed hyperlipidemia  Update lipid panel to risk stratify  Continue statin  - Lipid Panel; Future    3. Alcohol use  Continue to drink and discussed risk  Answered all questions  Work on cutting back    4. Class 1 obesity due to excess calories with serious comorbidity and body mass index (BMI) of 34.0 to 34.9 in adult  Update labs  Complicating all aspects of patients care. - Lipid Panel;  Future  - Hemoglobin A1C; Future    Return in about 3 months (around 10/20/2022). Future Appointments   Date Time Provider Forrest Moreno   7/20/2022  1:00 PM Carlos Jose MD [facility]       An  electronic signature was used to authenticate this note. --Carlos Jose MD on 7/20/2022 at 12:27 PM    {Coding Help -- Use CPT 87351-25868 with EM elements or Time rules for Office Visits. :    >20 minutes were spent on the digital evaluation and management of this patient. Pursuant to the emergency declaration under the [facility], Count includes the [facility] waiver authority and the [employer] Act, this Virtual  Visit was conducted, with patient's consent, to reduce the patient's risk of exposure to COVID-19 and provide continuity of care for an established patient. Services were provided through a video synchronous discussion virtually to substitute for in-person clinic visit.

## 2022-11-17 ENCOUNTER — OFFICE VISIT (OUTPATIENT)
Dept: PRIMARY CARE CLINIC | Age: 47
End: 2022-11-17
Payer: MEDICARE

## 2022-11-17 VITALS
HEIGHT: 68 IN | BODY MASS INDEX: 30.71 KG/M2 | HEART RATE: 85 BPM | WEIGHT: 202.6 LBS | DIASTOLIC BLOOD PRESSURE: 99 MMHG | SYSTOLIC BLOOD PRESSURE: 154 MMHG

## 2022-11-17 DIAGNOSIS — Z78.9 ALCOHOL USE: ICD-10-CM

## 2022-11-17 DIAGNOSIS — F32.89 OTHER DEPRESSION: ICD-10-CM

## 2022-11-17 DIAGNOSIS — I10 ESSENTIAL HYPERTENSION: Primary | ICD-10-CM

## 2022-11-17 DIAGNOSIS — E78.2 MIXED HYPERLIPIDEMIA: ICD-10-CM

## 2022-11-17 PROCEDURE — 3074F SYST BP LT 130 MM HG: CPT | Performed by: FAMILY MEDICINE

## 2022-11-17 PROCEDURE — G8484 FLU IMMUNIZE NO ADMIN: HCPCS | Performed by: FAMILY MEDICINE

## 2022-11-17 PROCEDURE — G8417 CALC BMI ABV UP PARAM F/U: HCPCS | Performed by: FAMILY MEDICINE

## 2022-11-17 PROCEDURE — 99214 OFFICE O/P EST MOD 30 MIN: CPT | Performed by: FAMILY MEDICINE

## 2022-11-17 PROCEDURE — 1036F TOBACCO NON-USER: CPT | Performed by: FAMILY MEDICINE

## 2022-11-17 PROCEDURE — 3079F DIAST BP 80-89 MM HG: CPT | Performed by: FAMILY MEDICINE

## 2022-11-17 PROCEDURE — G8427 DOCREV CUR MEDS BY ELIG CLIN: HCPCS | Performed by: FAMILY MEDICINE

## 2022-11-17 RX ORDER — HYDROCHLOROTHIAZIDE 25 MG/1
25 TABLET ORAL DAILY
Qty: 90 TABLET | Refills: 3 | Status: SHIPPED | OUTPATIENT
Start: 2022-11-17 | End: 2023-02-15

## 2022-11-17 RX ORDER — ATORVASTATIN CALCIUM 10 MG/1
10 TABLET, FILM COATED ORAL DAILY
Qty: 90 TABLET | Refills: 3 | Status: SHIPPED | OUTPATIENT
Start: 2022-11-17

## 2022-11-17 NOTE — PROGRESS NOTES
Chief Complaint   Patient presents with    Hypertension         ASSESSMENT/PLAN:  1. Essential hypertension  Uncontrolled  Will increase HCTZ to 25 mg daily  Follow up in 2 weeks to gauge improvement; if none, will add CCB or ARB  Update CMP  - hydroCHLOROthiazide (HYDRODIURIL) 25 MG tablet; Take 1 tablet by mouth daily  Dispense: 90 tablet; Refill: 3  - Comprehensive Metabolic Panel; Future    2. Alcohol use  Still drinking about the same amt, has tried to cut back in the past.  Self medicating  Will establish with [facility]  Doesn't want to start SSRI for depression symptoms    3. Other depression  Referral placed for [facility]  He is open to this vs medication at this time. - Ambulatory referral to Psychology    4. Mixed hyperlipidemia  Continue statin  Offered to increase   Discussed lifestyle changes and dietary modifications  Update lipid panel to gauge ASCVD risk   - atorvastatin (LIPITOR) 10 MG tablet; Take 1 tablet by mouth daily  Dispense: 90 tablet; Refill: 3         HPI:  Chilo Reynolds is a 56 y.o. (: 1975) here today for chronic condition mgmt. HTN  Rx: HCTZ 12.5 mg daily, forgets to take meds about every 2 days and will then double up on the morning after if her forgot the day before. Took the med this AM.   Lab Results   Component Value Date    CREATININE 1.2 2021     BP Readings from Last 3 Encounters:   22 (!) 154/99   10/07/21 129/88   10/02/21 (!) 132/95     Has been drinking more heavily again. Reasoning: he states he is sad and is self medicating   He isn't interested in medications but would like to talk with someone to help him with his thoughts and behaviors. Drinking about 3 tall boys each night. His cousin  recently and this has caused a bit of a spiral and life stressor. Hyperlipidemia  Patient is not following a low fat, low cholesterol diet. is not exercising regularly. OTC Supplements: none.   Rx: lipitor 10 mg daily  Lab Results   Component Value Date    ALT 47 (H) 06/23/2021     (H) 06/23/2021     Lab Results   Component Value Date    CHOL 134 11/05/2020     Lab Results   Component Value Date    TRIG 134 11/05/2020     Lab Results   Component Value Date    HDL 56 11/05/2020     Lab Results   Component Value Date    LDLCALC 51 11/05/2020       ASA: no  The 10-year ASCVD risk score (Alphonse SCHMIDT, et al., 2019) is: 8.7%    Values used to calculate the score:      Age: 56 years      Sex: Male      Is Non- : Yes      Diabetic: No      Tobacco smoker: No      Systolic Blood Pressure: 597 mmHg      Is BP treated: Yes      HDL Cholesterol: 56 mg/dL      Total Cholesterol: 134 mg/dL    Review of Systems   Constitutional:  Negative for activity change, chills, fatigue and fever. HENT:  Negative for congestion. Eyes:  Negative for redness. Respiratory:  Negative for cough, chest tightness, shortness of breath and wheezing. Cardiovascular:  Negative for chest pain and palpitations. Gastrointestinal:  Negative for abdominal pain and diarrhea. Genitourinary:  Negative for difficulty urinating. Musculoskeletal:  Negative for arthralgias. Skin:  Negative for rash. Allergic/Immunologic: Negative for immunocompromised state. Neurological:  Negative for dizziness, light-headedness and headaches. Hematological:  Negative for adenopathy. Psychiatric/Behavioral:  Positive for behavioral problems and sleep disturbance. Negative for self-injury and suicidal ideas. The patient is nervous/anxious.       Past Medical History:   Diagnosis Date    Kidney stone        Family History   Problem Relation Age of Onset    Osteoarthritis Mother     Cancer Mother     Asthma Father     Diabetes Sister     Hypertension Sister     Stroke Sister     Seizures Sister        Social History     Tobacco Use    Smoking status: Former     Packs/day: 1.50     Years: 7.00     Pack years: 10.50     Types: Cigarettes     Quit date: 10/29/1997     Years since quitting: 25.0    Smokeless tobacco: Never   Vaping Use    Vaping Use: Never used   Substance Use Topics    Alcohol use: Yes     Alcohol/week: 4.0 standard drinks     Types: 4 Cans of beer per week     Comment: Daily. Drug use: Not Currently       New Prescriptions    No medications on file       Meds Prior to visit:  Current Outpatient Medications on File Prior to Visit   Medication Sig Dispense Refill    albuterol sulfate HFA (PROVENTIL HFA) 108 (90 Base) MCG/ACT inhaler Inhale 2 puffs into the lungs every 4 hours as needed for Wheezing or Shortness of Breath Please provide pt c spacer as well 1 each 3    Blood Pressure KIT 1 kit by Does not apply route daily 1 kit 0    Spacer/Aero-Holding Chambers (VALVED HOLDING CHAMBER) DOUGIE USE WITH INHALER AS DIRECTED 1 Device 0     No current facility-administered medications on file prior to visit. Allergies   Allergen Reactions    Bee Venom        OBJECTIVE:  BP (!) 154/99   Pulse 85   Ht 5' 8\" (1.727 m)   Wt 202 lb 9.6 oz (91.9 kg)   BMI 30.81 kg/m²   BP Readings from Last 2 Encounters:   11/17/22 (!) 154/99   10/07/21 129/88     Wt Readings from Last 3 Encounters:   11/17/22 202 lb 9.6 oz (91.9 kg)   10/07/21 224 lb 9.6 oz (101.9 kg)   10/02/21 228 lb 12.8 oz (103.8 kg)       Physical Exam  Vitals and nursing note reviewed. Constitutional:       General: He is not in acute distress. Appearance: Normal appearance. He is well-developed. He is obese. He is not ill-appearing. HENT:      Head: Normocephalic and atraumatic. Right Ear: Tympanic membrane, ear canal and external ear normal. There is no impacted cerumen. Left Ear: Ear canal and external ear normal. There is no impacted cerumen. Nose: Nose normal. No congestion or rhinorrhea. Mouth/Throat:      Mouth: Mucous membranes are moist.      Pharynx: Oropharynx is clear. No oropharyngeal exudate or posterior oropharyngeal erythema. Eyes:      General: No scleral icterus.         Right eye: No discharge. Left eye: No discharge. Extraocular Movements: Extraocular movements intact. Conjunctiva/sclera: Conjunctivae normal.      Pupils: Pupils are equal, round, and reactive to light. Cardiovascular:      Rate and Rhythm: Normal rate and regular rhythm. Pulses: Normal pulses. Heart sounds: Normal heart sounds. No murmur heard. Comments: Normal radial and pedal pulses  Pulmonary:      Effort: Pulmonary effort is normal. No respiratory distress. Breath sounds: Normal breath sounds. No wheezing. Chest:      Chest wall: No tenderness. Abdominal:      General: Bowel sounds are normal. There is no distension. Palpations: Abdomen is soft. There is no mass. Tenderness: There is no abdominal tenderness. Comments: Normal liver and spleen, no organomegaly. Musculoskeletal:         General: No tenderness, deformity or signs of injury. Normal range of motion. Cervical back: Normal range of motion and neck supple. Right lower leg: No edema. Left lower leg: No edema. Comments: Range of motion intact in all extremities   Lymphadenopathy:      Cervical: No cervical adenopathy. Skin:     General: Skin is warm and dry. Capillary Refill: Capillary refill takes less than 2 seconds. Findings: No erythema or rash. Neurological:      General: No focal deficit present. Mental Status: He is alert and oriented to person, place, and time. Mental status is at baseline. Sensory: No sensory deficit. Motor: No abnormal muscle tone. Coordination: Coordination normal.   Psychiatric:         Mood and Affect: Mood normal.         Behavior: Behavior normal.         Thought Content:  Thought content normal.         Judgment: Judgment normal.      Comments:         Lab Results   Component Value Date    WBC 5.8 11/05/2020    HGB 13.6 11/05/2020    HCT 40.6 11/05/2020    MCV 97.3 11/05/2020     11/05/2020     Lab Results   Component Value Date     (L) 06/23/2021    K 4.3 06/23/2021    CL 97 (L) 06/23/2021    CO2 20 (L) 06/23/2021    BUN 4 (L) 06/23/2021    CREATININE 1.2 06/23/2021    GLUCOSE 130 (H) 06/23/2021    CALCIUM 8.1 (L) 06/23/2021    PROT 6.5 06/23/2021    LABALBU 3.1 (L) 06/23/2021    BILITOT 1.3 (H) 06/23/2021    ALKPHOS 340 (H) 06/23/2021     (H) 06/23/2021    ALT 47 (H) 06/23/2021    LABGLOM >60 06/23/2021    GFRAA >60 06/23/2021    AGRATIO 0.9 (L) 06/23/2021    GLOB 3.4 06/23/2021     Lab Results   Component Value Date    CHOL 134 11/05/2020    CHOL 262 (H) 11/05/2019     Lab Results   Component Value Date    TRIG 134 11/05/2020    TRIG 612 (H) 11/05/2019     Lab Results   Component Value Date    HDL 56 11/05/2020    HDL 61 (H) 11/05/2019     Lab Results   Component Value Date    LDLCALC 51 11/05/2020    [facility] see below 11/05/2019     Lab Results   Component Value Date    LABVLDL 27 11/05/2020    LABVLDL see below 11/05/2019     Lab Results   Component Value Date    LABA1C 5.5 10/29/2019         Discussed use, benefit, and side effects of prescribed medications. Barriers to medication compliance addressed. All patient questions answered. Pt voiced understanding. RTC Return in about 4 weeks (around 12/15/2022).     Future Appointments   Date Time Provider Forrest Moreno   1[facility]11/2023 11:[facility]   1/11/2023  1:00 PM MD Olivia Linder MD  11/21/2022  2:43 PM

## 2022-11-18 ENCOUNTER — TELEPHONE (OUTPATIENT)
Dept: PRIMARY CARE CLINIC | Age: 47
End: 2022-11-18

## 2022-11-18 NOTE — TELEPHONE ENCOUNTER
Called pt and informed to use Antibiotic Ointment that is sold otc Neosporin . Pt voiced understanding and will try this.

## 2022-11-18 NOTE — TELEPHONE ENCOUNTER
----- Message from Cristal Coates MA sent at 11/18/2022  7:45 AM EST -----  Subject: Message to Provider    QUESTIONS  Information for Provider? Forgot to tell Dr. Juan Hermosillo about cut on his left   thumb. It is red and stinging. Requesting antibiotic. Using ice pack. Please call patient.   ---------------------------------------------------------------------------  --------------  Estefania [facility]  [phone]; OK to leave message on voicemail  ---------------------------------------------------------------------------  --------------  SCRIPT ANSWERS  Relationship to Patient?  Self

## 2022-11-21 ASSESSMENT — ENCOUNTER SYMPTOMS
EYE REDNESS: 0
DIARRHEA: 0
CHEST TIGHTNESS: 0
ABDOMINAL PAIN: 0
SHORTNESS OF BREATH: 0
WHEEZING: 0
COUGH: 0

## 2023-01-09 DIAGNOSIS — E78.2 MIXED HYPERLIPIDEMIA: ICD-10-CM

## 2023-01-09 DIAGNOSIS — I10 ESSENTIAL HYPERTENSION: ICD-10-CM

## 2023-01-09 RX ORDER — ATORVASTATIN CALCIUM 10 MG/1
10 TABLET, FILM COATED ORAL DAILY
Qty: 90 TABLET | Refills: 3 | Status: SHIPPED | OUTPATIENT
Start: 2023-01-09

## 2023-01-09 RX ORDER — HYDROCHLOROTHIAZIDE 25 MG/1
25 TABLET ORAL DAILY
Qty: 90 TABLET | Refills: 3 | Status: SHIPPED | OUTPATIENT
Start: 2023-01-09 | End: 2023-04-09

## 2023-01-09 NOTE — TELEPHONE ENCOUNTER
Medication:   Requested Prescriptions     Pending Prescriptions Disp Refills    atorvastatin (LIPITOR) 10 MG tablet [Pharmacy Med Name: ATORVASTATIN 10MG TABLETS] 90 tablet 3     Sig: TAKE 1 TABLET BY MOUTH DAILY        Last Filled:  11/17/2022    Patient Phone Number: [phone] (home)     Last appt: 11/17/2022   Next appt: 1/11/2023    RTC Return in about 4 weeks (around 12/15/2022).

## 2023-01-09 NOTE — PROGRESS NOTES
Medication:   Requested Prescriptions     Pending Prescriptions Disp Refills    hydroCHLOROthiazide (HYDRODIURIL) 25 MG tablet 90 tablet 3     Sig: Take 1 tablet by mouth daily        Last Filled:  11/17/2022    Patient Phone Number: [phone] (home)     Last appt: 11/17/2022   Next appt: 1/11/2023    Last OARRS: No flowsheet data found.

## 2023-02-10 ENCOUNTER — OFFICE VISIT (OUTPATIENT)
Dept: PRIMARY CARE CLINIC | Age: 48
End: 2023-02-10

## 2023-02-10 VITALS
WEIGHT: 209.2 LBS | HEIGHT: 68 IN | BODY MASS INDEX: 31.71 KG/M2 | DIASTOLIC BLOOD PRESSURE: 88 MMHG | SYSTOLIC BLOOD PRESSURE: 132 MMHG | HEART RATE: 101 BPM | TEMPERATURE: 97 F

## 2023-02-10 DIAGNOSIS — Z78.9 ALCOHOL USE: ICD-10-CM

## 2023-02-10 DIAGNOSIS — E78.2 MIXED HYPERLIPIDEMIA: ICD-10-CM

## 2023-02-10 DIAGNOSIS — K42.9 UMBILICAL HERNIA WITHOUT OBSTRUCTION AND WITHOUT GANGRENE: ICD-10-CM

## 2023-02-10 DIAGNOSIS — J30.89 NON-SEASONAL ALLERGIC RHINITIS, UNSPECIFIED TRIGGER: ICD-10-CM

## 2023-02-10 DIAGNOSIS — I10 ESSENTIAL HYPERTENSION: Primary | ICD-10-CM

## 2023-02-10 PROCEDURE — 99214 OFFICE O/P EST MOD 30 MIN: CPT | Performed by: FAMILY MEDICINE

## 2023-02-10 PROCEDURE — 3079F DIAST BP 80-89 MM HG: CPT | Performed by: FAMILY MEDICINE

## 2023-02-10 PROCEDURE — 3075F SYST BP GE 130 - 139MM HG: CPT | Performed by: FAMILY MEDICINE

## 2023-02-10 RX ORDER — ATORVASTATIN CALCIUM 10 MG/1
10 TABLET, FILM COATED ORAL DAILY
Qty: 90 TABLET | Refills: 3 | Status: SHIPPED | OUTPATIENT
Start: 2023-02-10

## 2023-02-10 RX ORDER — ALBUTEROL SULFATE 90 UG/1
2 AEROSOL, METERED RESPIRATORY (INHALATION) EVERY 4 HOURS PRN
Qty: 1 EACH | Refills: 3 | Status: SHIPPED | OUTPATIENT
Start: 2023-02-10 | End: 2024-02-10

## 2023-02-10 RX ORDER — HYDROCHLOROTHIAZIDE 25 MG/1
25 TABLET ORAL DAILY
Qty: 90 TABLET | Refills: 3 | Status: SHIPPED | OUTPATIENT
Start: 2023-02-10 | End: 2023-05-11

## 2023-02-10 SDOH — ECONOMIC STABILITY: FOOD INSECURITY: WITHIN THE PAST 12 MONTHS, THE FOOD YOU BOUGHT JUST DIDN'T LAST AND YOU DIDN'T HAVE MONEY TO GET MORE.: NEVER TRUE

## 2023-02-10 SDOH — ECONOMIC STABILITY: FOOD INSECURITY: WITHIN THE PAST 12 MONTHS, YOU WORRIED THAT YOUR FOOD WOULD RUN OUT BEFORE YOU GOT MONEY TO BUY MORE.: NEVER TRUE

## 2023-02-10 SDOH — ECONOMIC STABILITY: INCOME INSECURITY: HOW HARD IS IT FOR YOU TO PAY FOR THE VERY BASICS LIKE FOOD, HOUSING, MEDICAL CARE, AND HEATING?: NOT HARD AT ALL

## 2023-02-10 SDOH — ECONOMIC STABILITY: HOUSING INSECURITY
IN THE LAST 12 MONTHS, WAS THERE A TIME WHEN YOU DID NOT HAVE A STEADY PLACE TO SLEEP OR SLEPT IN A SHELTER (INCLUDING NOW)?: NO

## 2023-02-10 ASSESSMENT — PATIENT HEALTH QUESTIONNAIRE - PHQ9
2. FEELING DOWN, DEPRESSED OR HOPELESS: 0
5. POOR APPETITE OR OVEREATING: 0
7. TROUBLE CONCENTRATING ON THINGS, SUCH AS READING THE NEWSPAPER OR WATCHING TELEVISION: 0
SUM OF ALL RESPONSES TO PHQ QUESTIONS 1-9: 0
SUM OF ALL RESPONSES TO PHQ9 QUESTIONS 1 & 2: 0
SUM OF ALL RESPONSES TO PHQ QUESTIONS 1-9: 0
SUM OF ALL RESPONSES TO PHQ QUESTIONS 1-9: 0
1. LITTLE INTEREST OR PLEASURE IN DOING THINGS: 0
6. FEELING BAD ABOUT YOURSELF - OR THAT YOU ARE A FAILURE OR HAVE LET YOURSELF OR YOUR FAMILY DOWN: 0
8. MOVING OR SPEAKING SO SLOWLY THAT OTHER PEOPLE COULD HAVE NOTICED. OR THE OPPOSITE, BEING SO FIGETY OR RESTLESS THAT YOU HAVE BEEN MOVING AROUND A LOT MORE THAN USUAL: 0
4. FEELING TIRED OR HAVING LITTLE ENERGY: 0
3. TROUBLE FALLING OR STAYING ASLEEP: 0
SUM OF ALL RESPONSES TO PHQ QUESTIONS 1-9: 0
9. THOUGHTS THAT YOU WOULD BE BETTER OFF DEAD, OR OF HURTING YOURSELF: 0

## 2023-02-10 ASSESSMENT — ENCOUNTER SYMPTOMS
SHORTNESS OF BREATH: 0
EYE REDNESS: 0
DIARRHEA: 0
CHEST TIGHTNESS: 0
ABDOMINAL PAIN: 0
COUGH: 0
WHEEZING: 0

## 2023-02-10 NOTE — PATIENT INSTRUCTIONS
Dr. Bettyjo Closs at [facility]    To schedule an appointment, please call  [phone]

## 2023-02-10 NOTE — PROGRESS NOTES
Chief Complaint   Patient presents with    Hypertension    Medication Refill     all         ASSESSMENT/PLAN:  1. Essential hypertension  Controlled in triage  Refilled med  Follow-up in 3 months to continue to monitor and gauge improvement  Recommended he cut back on alcohol and try to lose weight  - hydroCHLOROthiazide (HYDRODIURIL) 25 MG tablet; Take 1 tablet by mouth daily  Dispense: 90 tablet; Refill: 3    2. Alcohol use  2 tall boys every night  Discussed risks of continuing and benefits of quitting  Patient understands and will try  Has been followed by Dr. Winston Hodgson before and will think about rescheduling to discuss self medicating to treat depression    3. Mixed hyperlipidemia  Reviewed risk  Continue Lipitor with intention on increasing but he is not ready to do this yet  - atorvastatin (LIPITOR) 10 MG tablet; Take 1 tablet by mouth daily  Dispense: 90 tablet; Refill: 3    4. Non-seasonal allergic rhinitis, unspecified trigger  Controlled  Refill albuterol  - albuterol sulfate HFA (PROVENTIL HFA) 108 (90 Base) MCG/ACT inhaler; Inhale 2 puffs into the lungs every 4 hours as needed for Wheezing or Shortness of Breath Please provide pt c spacer as well  Dispense: 1 each; Refill: 3    5. Umbilical hernia without obstruction and without gangrene  Referral placed for evaluation and repair of umbilical hernia  - External Referral To General Surgery         HPI:  Barbara Buckley is a 56 y.o. (: 1975) here today for chronic condition management and to discuss hernia. HTN: Taking hydrochlorothiazide 25 mg daily, compliant. No shortness of breath or chest pain.   Not checking blood pressure at home  Lab Results   Component Value Date    CREATININE 1.2 2021     BP Readings from Last 3 Encounters:   02/10/23 132/88   22 (!) 154/99   10/07/21 129/88     HLD: Taking Lipitor daily  The 10-year ASCVD risk score (Alphonse SCHMIDT, et al., 2019) is: 6.6%    Values used to calculate the score:      Age: 56 years Sex: Male      Is Non- : Yes      Diabetic: No      Tobacco smoker: No      Systolic Blood Pressure: 911 mmHg      Is BP treated: Yes      HDL Cholesterol: 56 mg/dL      Total Cholesterol: 134 mg/dL      Alcohol use: 2 tall boys every night religiously. Patient states that he has tried to cut back but cannot. 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 It is a way he can feel close to his family. Discussed risk of this. Needs refill of all of his medications, including as much inhaler. Main concern today is getting referral to surgeon at [facility] to repair his umbilical hernia. Fully reducible. No pain, no blood in stool. Review of Systems   Constitutional:  Negative for activity change, chills, fatigue and fever. HENT:  Negative for congestion. Eyes:  Negative for redness. Respiratory:  Negative for cough, chest tightness, shortness of breath and wheezing. Cardiovascular:  Negative for chest pain and palpitations. Gastrointestinal:  Negative for abdominal pain and diarrhea. Genitourinary:  Negative for difficulty urinating. Musculoskeletal:  Negative for arthralgias. Skin:  Negative for rash. Allergic/Immunologic: Negative for immunocompromised state. Neurological:  Negative for dizziness, light-headedness and headaches. Hematological:  Negative for adenopathy. Psychiatric/Behavioral:  Negative for behavioral problems.       Past Medical History:   Diagnosis Date    Kidney stone        Family History   Problem Relation Age of Onset    Osteoarthritis Mother     Cancer Mother     Asthma Father     Diabetes Sister     Hypertension Sister     Stroke Sister     Seizures Sister        Social History     Tobacco Use    Smoking status: Former     Packs/day: 1.50     Years: 7.00     Pack years: 10.50     Types: Cigarettes     Quit date: 10/29/1997     Years since quittin.3    Smokeless tobacco: Never   Vaping Use    Vaping Use: Never used   Substance Use Topics    Alcohol use: Yes     Alcohol/week: 4.0 standard drinks     Types: 4 Cans of beer per week     Comment: Daily. Drug use: Not Currently       New Prescriptions    No medications on file       Meds Prior to visit:  Current Outpatient Medications on File Prior to Visit   Medication Sig Dispense Refill    Blood Pressure KIT 1 kit by Does not apply route daily 1 kit 0    Spacer/Aero-Holding Chambers (VALVED HOLDING CHAMBER) DOUGIE USE WITH INHALER AS DIRECTED 1 Device 0     No current facility-administered medications on file prior to visit. Allergies   Allergen Reactions    Bee Venom        OBJECTIVE:  /88   Pulse (!) 101   Temp 97 °F (36.1 °C) (Temporal)   Ht 5' 8\" (1.727 m)   Wt 209 lb 3.2 oz (94.9 kg)   BMI 31.81 kg/m²   BP Readings from Last 2 Encounters:   02/10/23 132/88   11/17/22 (!) 154/99     Wt Readings from Last 3 Encounters:   02/10/23 209 lb 3.2 oz (94.9 kg)   11/17/22 202 lb 9.6 oz (91.9 kg)   10/07/21 224 lb 9.6 oz (101.9 kg)       Physical Exam  Vitals and nursing note reviewed. Constitutional:       General: He is not in acute distress. Appearance: Normal appearance. He is well-developed. He is obese. He is not ill-appearing. HENT:      Head: Normocephalic and atraumatic. Right Ear: Tympanic membrane, ear canal and external ear normal. There is no impacted cerumen. Left Ear: Ear canal and external ear normal. There is no impacted cerumen. Nose: Nose normal. No congestion or rhinorrhea. Mouth/Throat:      Mouth: Mucous membranes are moist.      Pharynx: Oropharynx is clear. No oropharyngeal exudate or posterior oropharyngeal erythema. Eyes:      General: No scleral icterus. Right eye: No discharge. Left eye: No discharge. Extraocular Movements: Extraocular movements intact. Conjunctiva/sclera: Conjunctivae normal.      Pupils: Pupils are equal, round, and reactive to light.    Cardiovascular:      Rate and Rhythm: Normal rate and regular rhythm. Pulses: Normal pulses. Heart sounds: Normal heart sounds. No murmur heard. Comments: Normal radial and pedal pulses  Pulmonary:      Effort: Pulmonary effort is normal. No respiratory distress. Breath sounds: Normal breath sounds. No wheezing. Chest:      Chest wall: No tenderness. Abdominal:      General: Bowel sounds are normal. There is no distension. Palpations: Abdomen is soft. There is no mass. Tenderness: There is no abdominal tenderness. Hernia: A hernia is present. Comments: Normal liver and spleen, no organomegaly. Umbilical hernia, fully reducible. No pain. Musculoskeletal:         General: No tenderness, deformity or signs of injury. Normal range of motion. Cervical back: Normal range of motion and neck supple. Right lower leg: No edema. Left lower leg: No edema. Comments: Range of motion intact in all extremities   Lymphadenopathy:      Cervical: No cervical adenopathy. Skin:     General: Skin is warm and dry. Capillary Refill: Capillary refill takes less than 2 seconds. Findings: No erythema or rash. Neurological:      General: No focal deficit present. Mental Status: He is alert and oriented to person, place, and time. Mental status is at baseline. Sensory: No sensory deficit. Motor: No abnormal muscle tone. Coordination: Coordination normal.   Psychiatric:         Mood and Affect: Mood normal.         Behavior: Behavior normal.         Thought Content:  Thought content normal.         Judgment: Judgment normal.      Comments:         Lab Results   Component Value Date    WBC 5.8 11/05/2020    HGB 13.6 11/05/2020    HCT 40.6 11/05/2020    MCV 97.3 11/05/2020     11/05/2020     Lab Results   Component Value Date     (L) 06/23/2021    K 4.3 06/23/2021    CL 97 (L) 06/23/2021    CO2 20 (L) 06/23/2021    BUN 4 (L) 06/23/2021    CREATININE 1.2 06/23/2021    GLUCOSE 130 (H) 06/23/2021    CALCIUM 8.1 (L) 06/23/2021    PROT 6.5 06/23/2021    LABALBU 3.1 (L) 06/23/2021    BILITOT 1.3 (H) 06/23/2021    ALKPHOS 340 (H) 06/23/2021     (H) 06/23/2021    ALT 47 (H) 06/23/2021    LABGLOM >60 06/23/2021    GFRAA >60 06/23/2021    AGRATIO 0.9 (L) 06/23/2021    GLOB 3.4 06/23/2021     Lab Results   Component Value Date    CHOL 134 11/05/2020    CHOL 262 (H) 11/05/2019     Lab Results   Component Value Date    TRIG 134 11/05/2020    TRIG 612 (H) 11/05/2019     Lab Results   Component Value Date    HDL 56 11/05/2020    HDL 61 (H) 11/05/2019     Lab Results   Component Value Date    LDLCALC 51 11/05/2020    [address] see below 11/05/2019     Lab Results   Component Value Date    LABVLDL 27 11/05/2020    LABVLDL see below 11/05/2019     Lab Results   Component Value Date    LABA1C 5.5 10/29/2019         Discussed use, benefit, and side effects of prescribed medications. Barriers to medication compliance addressed. All patient questions answered. Pt voiced understanding. RTC No follow-ups on file. No future appointments.       Mariah Koroma MD  2/10/2023  4:27 PM

## 2023-05-09 ENCOUNTER — TELEPHONE (OUTPATIENT)
Dept: PRIMARY CARE CLINIC | Age: 48
End: 2023-05-09

## 2023-05-09 DIAGNOSIS — I10 ESSENTIAL HYPERTENSION: ICD-10-CM

## 2023-05-09 DIAGNOSIS — E78.2 MIXED HYPERLIPIDEMIA: ICD-10-CM

## 2023-05-09 NOTE — PROGRESS NOTES
Medication:   Requested Prescriptions     Pending Prescriptions Disp Refills    atorvastatin (LIPITOR) 10 MG tablet 90 tablet 3     Sig: Take 1 tablet by mouth daily    hydroCHLOROthiazide (HYDRODIURIL) 25 MG tablet 90 tablet 3     Sig: Take 1 tablet by mouth daily        Last Filled:  02/10/23    Patient Phone Number: [phone] (home)     Last appt: 2/10/2023   Next appt: 5/25/2023    Last OARRS: No flowsheet data found.

## 2023-05-11 RX ORDER — ATORVASTATIN CALCIUM 10 MG/1
10 TABLET, FILM COATED ORAL DAILY
Qty: 90 TABLET | Refills: 3 | Status: SHIPPED | OUTPATIENT
Start: 2023-05-11

## 2023-05-11 RX ORDER — HYDROCHLOROTHIAZIDE 25 MG/1
25 TABLET ORAL DAILY
Qty: 90 TABLET | Refills: 3 | Status: SHIPPED | OUTPATIENT
Start: 2023-05-11 | End: 2023-08-09

## 2023-05-15 RX ORDER — INHALER, ASSIST DEVICES
SPACER (EA) MISCELLANEOUS
Qty: 1 EACH | Refills: 3 | Status: SHIPPED | OUTPATIENT
Start: 2023-05-15

## 2023-05-15 NOTE — PROGRESS NOTES
Medication:   Requested Prescriptions     Pending Prescriptions Disp Refills    Spacer/Aero-Holding Chambers (VALVED HOLDING CHAMBER) DOUGIE       Sig: USE WITH INHALER AS DIRECTED        Last Filled:  04/03/2020    Patient Phone Number: [phone] (home)     Last appt: 2/10/2023   Next appt: 5/25/2023    Last OARRS: No flowsheet data found.